# Patient Record
Sex: FEMALE | Race: WHITE | NOT HISPANIC OR LATINO | Employment: FULL TIME | URBAN - METROPOLITAN AREA
[De-identification: names, ages, dates, MRNs, and addresses within clinical notes are randomized per-mention and may not be internally consistent; named-entity substitution may affect disease eponyms.]

---

## 2017-04-25 ENCOUNTER — TELEPHONE (OUTPATIENT)
Dept: NEUROLOGY | Facility: HOSPITAL | Age: 42
End: 2017-04-25

## 2017-04-25 DIAGNOSIS — C7A.026 CARCINOID TUMOR, RECTAL, MALIGNANT: Primary | ICD-10-CM

## 2017-04-25 NOTE — TELEPHONE ENCOUNTER
----- Message from Sonia Nevarez sent at 4/25/2017  9:01 AM CDT -----  Contact: Patient  EAW/NEW Who Called? Patient                                      Referred by: through a Radiologist                                      Why do you need to be seen? September 2016 was diagnosed                                      Which doctor are you requesting? Dr Anaya                                      You may contact patient back to schedule at: [phone]    Instructed patient to gather medical records, Cd's and medication list and fax or mail to us asap.

## 2017-04-25 NOTE — LETTER
April 26, 2017    Celena Kumar  [address] [facility] [address]             [facility]  Tumor Program  [address]  [address]  [place] [address]  Phone: [phone]  Fax: [phone] Dear Ms. Kumar:    Thank you for your interest in our program. It was my pleasure speaking with you today about your upcoming appointment.     You have a scheduled appointment with Dr Juan J Anaya MD on Tuesday, May 30, 2017 at 8:30 AM. Our office is located at :    [facility]  Medical Office [facility]  Neuroendocrine Tumor Clinic  [address], [address]  [place], [address]    You are scheduled for a consultation only with the physicians unless otherwise planned. Plan to be here for your visit for 2-3 hrs. If flight arrangements are made, plan to make the return flight after 6 pm if possible. The [place] airport ([facility] is only 10 minutes from [facility].    In preparation for your appointment, we ask that you gather the information below and fax them to us ASAP. This will enable us to review all pertinent information at the time of your visit so that recommendations can be made and a plan of care developed for you.     Please return forms along with all paper records via fax asap.    Please fax  1. Insurance cards (front and back)-enlarged if possible  2. Drivers licenses  3. Current medicine list  4. Name, address & phone # of your physician for correspondence  5. Authorization for Release of Information-complete and return to clinic  6. Medical Records-send as soon as you have them together (see guidelines below)    Lab Work: If requested, the special lab markers do require special tubes that have to be ordered by the ordering facility. The lab work should be within 3 months.. The labs take 2-3 weeks to get results so please get labs drawn asap. The name and phone # of the send out lab that does the special labs tests is on the order. You can have the labs  drawn at [facility], a local hospital, or your doctors office (whichever works). If these lab tests need to be done, I will attach an Outpatient Order form. If you are doing your lab work at a facility other than [facility], call our office to notify us the date you have them drawn and the location and phone number of the lab for easy follow up.    Scans: Please mail copies of CD's of your last scans to the office asap. I would recommend sending them overnight with a tracking number in case of any problems. If you need updated scans, I will attach an Outpatient Order form.    Tissue Biopsy/Pathology: If you had a tissue biopsy or surgery, we will request to have your slides and/or tissue blocks sent to us to perform some special testing on them. Please provide us with a pathology report asa. This testing will be billed to your insurance company.     Operative or Procedure reports: All surgery or procedure reports related to your neuroendocrine tumor should be sent to us.    Insurance Company: You should contact your insurance company to inquire about your insurance coverage and benefits. Ask about co-payments and deductibles when seeing a specialist. Ask if this visit will be in Network or Out of Network. We may be able to work with you if this is out of network for you.    Lodging: Attached is lodging information from the [employer] and a list of local hotels. The [facility] is run by the [employer] and is free of charge. If [facility] to [facility], you must call my office and talk to [facility]. You will need to complete the application and send it to my office for a physician signature. We will forward it to the [facility] and they will check availability. If you wish to stay at the [facility], apply EARLY, they fill up quickly. You may contact the [facility] a week later to confirm your reservation and ask any questions regarding the facility. You  May only stay at the [facility] 24 hrs prior to your  appointment and up to 24 hrs after your appointment. (THIS CAN ONLY BE USED IF YOU LIVE MORE THAN 40 MILES FROM OUR FACILITY).    Call me if you have any questions, email is not the best way to communicate with our office.    We are looking forward to meeting and taking care of you. If you have any questions or concerns, please don't hesitate to call.     Sincerely,        Shi Jason, RN, BSN  Nurse Manager, Neuroendocrine Tumor Program

## 2017-04-26 NOTE — TELEPHONE ENCOUNTER
Ordered MRI, tumor markers and path re read per protocol. Appointment made with MD, info sent to patient. Diagnosed with appendix and had appendectomy then had a c-scope and found it in her rectum.  In lots of pain, never tested any lymph nodes.  Wants a gallium scan and is coming back in August to have that done.

## 2017-04-26 NOTE — TELEPHONE ENCOUNTER
----- Message from Sonia Nevarez sent at 4/25/2017  9:01 AM CDT -----  Contact: Patient  EAW/NEW Who Called? Patient                                      Referred by: through a Radiologist                                      Why do you need to be seen? September 2016 was diagnosed                                      Which doctor are you requesting? Dr Anaya                                      You may contact patient back to schedule at: [phone]    Instructed patient to gather medical records, Cd's and medication list and fax or mail to us asap.

## 2017-05-15 LAB
EXT 24 HR UR METANEPHRINE: NORMAL
EXT 24 HR UR NORMETANEPHRINE: NORMAL
EXT 24 HR UR NORMETANEPHRINE: NORMAL
EXT 25 HYDROXY VIT D2: NORMAL
EXT 25 HYDROXY VIT D3: NORMAL
EXT 5 HIAA 24 HR URINE: NORMAL
EXT 5 HIAA BLOOD: 14 NG/ML (ref 0–22)
EXT ACTH: NORMAL
EXT AFP: NORMAL
EXT ALBUMIN: NORMAL
EXT ALKALINE PHOSPHATASE: NORMAL
EXT ALT: NORMAL
EXT AMYLASE: NORMAL
EXT ANTI ISLET CELL AB: NORMAL
EXT ANTI PARIETAL CELL AB: NORMAL
EXT ANTI THYROID AB: NORMAL (ref 0–1.25)
EXT AST: NORMAL
EXT BILIRUBIN DIRECT: NORMAL MG/DL
EXT BILIRUBIN TOTAL: NORMAL
EXT BK VIRUS DNA QN PCR: NORMAL
EXT BUN: NORMAL
EXT C PEPTIDE: NORMAL
EXT CA 125: NORMAL
EXT CA 19-9: NORMAL
EXT CA 27-29: NORMAL
EXT CALCITONIN: NORMAL
EXT CALCIUM: NORMAL
EXT CEA: NORMAL
EXT CHLORIDE: NORMAL
EXT CHOLESTEROL: NORMAL
EXT CHROMOGRANIN A: 7 NG/ML (ref 0–15)
EXT CO2: NORMAL
EXT CREATININE UA: NORMAL
EXT CREATININE: NORMAL MG/DL
EXT CYCLOSPORONE LEVEL: NORMAL
EXT DOPAMINE: NORMAL
EXT EBV DNA BY PCR: NORMAL
EXT EPINEPHRINE: NORMAL
EXT FOLATE: 6.8 NG/ML
EXT FREE T3: NORMAL
EXT FREE T4: NORMAL
EXT FSH: NORMAL
EXT GASTRIN RELEASING PEPTIDE: NORMAL
EXT GASTRIN RELEASING PEPTIDE: NORMAL
EXT GASTRIN: 17 PG/ML (ref 0–100)
EXT GGT: NORMAL
EXT GHRELIN: NORMAL
EXT GLUCAGON: NORMAL
EXT GLUCOSE: NORMAL
EXT GROWTH HORMONE: NORMAL
EXT HCV RNA QUANT PCR: NORMAL
EXT HDL: NORMAL
EXT HEMATOCRIT: NORMAL
EXT HEMOGLOBIN A1C: NORMAL
EXT HEMOGLOBIN: NORMAL
EXT HISTAMINE 24 HR URINE: NORMAL
EXT HISTAMINE: NORMAL
EXT IGF-1: NORMAL
EXT IMMUNKNOW (NON-STIMULATED): NORMAL
EXT IMMUNKNOW (STIMULATED): NORMAL
EXT INR: NORMAL
EXT INSULIN: NORMAL
EXT LANREOTIDE LEVEL: NORMAL
EXT LDH, TOTAL: NORMAL
EXT LDL CHOLESTEROL: NORMAL
EXT LIPASE: NORMAL
EXT MAGNESIUM: NORMAL
EXT METANEPHRINE FREE PLASMA: NORMAL
EXT MOTILIN: NORMAL
EXT NEUROKININ A CAMB: NORMAL
EXT NEUROKININ A ISI: 14 PG/ML (ref 0–40)
EXT NEUROTENSIN: NORMAL
EXT NOREPINEPHRINE: NORMAL
EXT NORMETANEPHRINE: NORMAL
EXT NSE: NORMAL
EXT OCTREOTIDE LEVEL: NORMAL
EXT PANCREASTATIN CAMB: NORMAL
EXT PANCREASTATIN ISI: 47 PG/ML (ref 10–135)
EXT PANCREATIC POLYPEPTIDE: NORMAL
EXT PHOSPHORUS: NORMAL
EXT PLATELETS: NORMAL
EXT POTASSIUM: NORMAL
EXT PROGRAF LEVEL: NORMAL
EXT PROLACTIN: NORMAL
EXT PROTEIN TOTAL: NORMAL
EXT PROTEIN UA: NORMAL
EXT PT: NORMAL
EXT PTH, INTACT: NORMAL
EXT PTT: NORMAL
EXT RAPAMUNE LEVEL: NORMAL
EXT SEROTONIN: <10 NG/ML (ref 56–244)
EXT SODIUM: NORMAL MMOL/L
EXT SOMATOSTATIN: NORMAL
EXT SUBSTANCE P: NORMAL
EXT TRIGLYCERIDES: NORMAL
EXT TRYPTASE: NORMAL
EXT TSH: NORMAL
EXT URIC ACID: NORMAL
EXT URINE AMYLASE U/HR: NORMAL
EXT URINE AMYLASE U/L: NORMAL
EXT VASOACTIVE INTESTINAL POLYPEPTIDE: NORMAL
EXT VITAMIN B12: 463 PG/ML (ref 200–1100)
EXT VMA 24 HR URINE: NORMAL
EXT WBC: NORMAL
NEURON SPECIFIC ENOLASE: <5 NG/ML (ref 0–10.8)

## 2017-05-19 ENCOUNTER — TELEPHONE (OUTPATIENT)
Dept: NEUROLOGY | Facility: HOSPITAL | Age: 42
End: 2017-05-19

## 2017-05-19 NOTE — TELEPHONE ENCOUNTER
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 Do you need any other testing done? Patient can be reached at [phone]

## 2017-05-22 ENCOUNTER — TELEPHONE (OUTPATIENT)
Dept: NEUROLOGY | Facility: HOSPITAL | Age: 42
End: 2017-05-22

## 2017-05-22 DIAGNOSIS — C7A.026 CARCINOID TUMOR, RECTAL, MALIGNANT: Primary | ICD-10-CM

## 2017-05-22 NOTE — TELEPHONE ENCOUNTER
----- Message from Alia Gates sent at 5/22/2017 10:55 AM CDT -----  EAW- Patient just missed a call from here but there was no message left. Please call back to assist.

## 2017-05-22 NOTE — TELEPHONE ENCOUNTER
Returned pts call to inform that I spoke with Dr. Anaya regarding being diagnosed with Martin syndrome. Advised pt that per Dr. Anaya, no additional testing was needed prior to appointment, but pt should get yearly colonoscopies. Pt verbalizes understanding.

## 2017-05-25 ENCOUNTER — LAB VISIT (OUTPATIENT)
Dept: LAB | Facility: HOSPITAL | Age: 42
End: 2017-05-25
Attending: SURGERY
Payer: COMMERCIAL

## 2017-05-25 DIAGNOSIS — C7A.026 CARCINOID TUMOR, RECTAL, MALIGNANT: ICD-10-CM

## 2017-05-25 PROCEDURE — 88399 UNLISTED SURGICAL PATH PX: CPT

## 2017-05-25 PROCEDURE — 88323 CONSLTJ&REPRT MATRL PREP SLD: CPT | Mod: 26,59,,

## 2017-05-25 PROCEDURE — 88360 TUMOR IMMUNOHISTOCHEM/MANUAL: CPT | Mod: 26,,,

## 2017-05-25 PROCEDURE — 88360 TUMOR IMMUNOHISTOCHEM/MANUAL: CPT | Mod: 26,59,,

## 2017-05-29 ENCOUNTER — HOSPITAL ENCOUNTER (OUTPATIENT)
Dept: CARDIOLOGY | Facility: HOSPITAL | Age: 42
Discharge: HOME | End: 2017-05-29
Attending: SURGERY
Payer: COMMERCIAL

## 2017-05-29 ENCOUNTER — HOSPITAL ENCOUNTER (OUTPATIENT)
Dept: RADIOLOGY | Facility: HOSPITAL | Age: 42
Discharge: HOME | End: 2017-05-29
Attending: SURGERY
Payer: COMMERCIAL

## 2017-05-29 DIAGNOSIS — C7A.026 CARCINOID TUMOR, RECTAL, MALIGNANT: ICD-10-CM

## 2017-05-29 LAB
DIASTOLIC DYSFUNCTION: NO
ESTIMATED PA SYSTOLIC PRESSURE: 7.33
MITRAL VALVE MOBILITY: NORMAL
RETIRED EF AND QEF - SEE NOTES: 65 (ref 55–65)

## 2017-05-29 PROCEDURE — 93306 TTE W/DOPPLER COMPLETE: CPT | Mod: 26,,, | Performed by: INTERNAL MEDICINE

## 2017-05-29 PROCEDURE — A9585 GADOBUTROL INJECTION: HCPCS | Performed by: SURGERY

## 2017-05-29 PROCEDURE — 74183 MRI ABD W/O CNTR FLWD CNTR: CPT | Mod: TC

## 2017-05-29 PROCEDURE — 93306 TTE W/DOPPLER COMPLETE: CPT

## 2017-05-29 PROCEDURE — 74183 MRI ABD W/O CNTR FLWD CNTR: CPT | Mod: 26,,, | Performed by: RADIOLOGY

## 2017-05-29 PROCEDURE — 25500020 PHARM REV CODE 255: Performed by: SURGERY

## 2017-05-29 RX ORDER — GADOBUTROL 604.72 MG/ML
10 INJECTION INTRAVENOUS
Status: COMPLETED | OUTPATIENT
Start: 2017-05-29 | End: 2017-05-29

## 2017-05-29 RX ADMIN — GADOBUTROL 10 ML: 604.72 INJECTION INTRAVENOUS at 10:05

## 2017-05-30 ENCOUNTER — CLINICAL SUPPORT (OUTPATIENT)
Dept: NEUROLOGY | Facility: HOSPITAL | Age: 42
End: 2017-05-30
Payer: COMMERCIAL

## 2017-05-30 ENCOUNTER — OFFICE VISIT (OUTPATIENT)
Dept: NEUROLOGY | Facility: HOSPITAL | Age: 42
End: 2017-05-30
Attending: SURGERY
Payer: COMMERCIAL

## 2017-05-30 ENCOUNTER — HOSPITAL ENCOUNTER (OUTPATIENT)
Dept: RADIOLOGY | Facility: HOSPITAL | Age: 42
Discharge: HOME | End: 2017-05-30
Attending: SURGERY
Payer: COMMERCIAL

## 2017-05-30 VITALS
TEMPERATURE: 97 F | BODY MASS INDEX: 29.63 KG/M2 | HEART RATE: 89 BPM | WEIGHT: 161 LBS | HEIGHT: 62 IN | SYSTOLIC BLOOD PRESSURE: 118 MMHG | DIASTOLIC BLOOD PRESSURE: 73 MMHG

## 2017-05-30 DIAGNOSIS — C7A.020 MALIGNANT CARCINOID TUMOR OF THE APPENDIX: ICD-10-CM

## 2017-05-30 DIAGNOSIS — R19.7 DIARRHEA DUE TO MALABSORPTION: ICD-10-CM

## 2017-05-30 DIAGNOSIS — C7A.026 MALIGNANT CARCINOID TUMOR OF THE RECTUM: ICD-10-CM

## 2017-05-30 DIAGNOSIS — K90.9 DIARRHEA DUE TO MALABSORPTION: ICD-10-CM

## 2017-05-30 DIAGNOSIS — E34.0 CARCINOID SYNDROME: Primary | ICD-10-CM

## 2017-05-30 LAB
EXT 24 HR UR METANEPHRINE: ABNORMAL
EXT 24 HR UR NORMETANEPHRINE: ABNORMAL
EXT 24 HR UR NORMETANEPHRINE: ABNORMAL
EXT 25 HYDROXY VIT D2: ABNORMAL
EXT 25 HYDROXY VIT D3: ABNORMAL
EXT 5 HIAA 24 HR URINE: ABNORMAL
EXT 5 HIAA BLOOD: 7 NG/ML (ref 0–22)
EXT ACTH: ABNORMAL
EXT AFP: ABNORMAL
EXT ALBUMIN: ABNORMAL
EXT ALKALINE PHOSPHATASE: ABNORMAL
EXT ALT: ABNORMAL
EXT AMYLASE: ABNORMAL
EXT ANTI ISLET CELL AB: ABNORMAL
EXT ANTI PARIETAL CELL AB: ABNORMAL
EXT ANTI THYROID AB: ABNORMAL
EXT AST: ABNORMAL
EXT BILIRUBIN DIRECT: ABNORMAL MG/DL
EXT BILIRUBIN TOTAL: ABNORMAL
EXT BK VIRUS DNA QN PCR: ABNORMAL
EXT BUN: ABNORMAL
EXT C PEPTIDE: ABNORMAL
EXT CA 125: ABNORMAL
EXT CA 19-9: ABNORMAL
EXT CA 27-29: ABNORMAL
EXT CALCITONIN: ABNORMAL
EXT CALCIUM: ABNORMAL
EXT CEA: ABNORMAL
EXT CHLORIDE: ABNORMAL
EXT CHOLESTEROL: ABNORMAL
EXT CHROMOGRANIN A: ABNORMAL
EXT CO2: ABNORMAL
EXT CREATININE UA: ABNORMAL
EXT CREATININE: ABNORMAL MG/DL
EXT CYCLOSPORONE LEVEL: ABNORMAL
EXT DOPAMINE: ABNORMAL
EXT EBV DNA BY PCR: ABNORMAL
EXT EPINEPHRINE: ABNORMAL
EXT FOLATE: ABNORMAL
EXT FREE T3: ABNORMAL
EXT FREE T4: ABNORMAL
EXT FSH: ABNORMAL
EXT GASTRIN RELEASING PEPTIDE: ABNORMAL
EXT GASTRIN RELEASING PEPTIDE: ABNORMAL
EXT GASTRIN: ABNORMAL
EXT GGT: ABNORMAL
EXT GHRELIN: ABNORMAL
EXT GLUCAGON: ABNORMAL
EXT GLUCOSE: ABNORMAL
EXT GROWTH HORMONE: ABNORMAL
EXT HCV RNA QUANT PCR: ABNORMAL
EXT HDL: ABNORMAL
EXT HEMATOCRIT: ABNORMAL
EXT HEMOGLOBIN A1C: ABNORMAL
EXT HEMOGLOBIN: ABNORMAL
EXT HISTAMINE 24 HR URINE: ABNORMAL
EXT HISTAMINE: ABNORMAL
EXT IGF-1: ABNORMAL
EXT IMMUNKNOW (NON-STIMULATED): ABNORMAL
EXT IMMUNKNOW (STIMULATED): ABNORMAL
EXT INR: ABNORMAL
EXT INSULIN: ABNORMAL
EXT LANREOTIDE LEVEL: ABNORMAL
EXT LDH, TOTAL: ABNORMAL
EXT LDL CHOLESTEROL: ABNORMAL
EXT LIPASE: ABNORMAL
EXT MAGNESIUM: ABNORMAL
EXT METANEPHRINE FREE PLASMA: ABNORMAL
EXT MOTILIN: ABNORMAL
EXT NEUROKININ A CAMB: ABNORMAL
EXT NEUROKININ A ISI: 19 PG/ML (ref 0–40)
EXT NEUROTENSIN: ABNORMAL
EXT NOREPINEPHRINE: ABNORMAL
EXT NORMETANEPHRINE: ABNORMAL
EXT NSE: ABNORMAL
EXT OCTREOTIDE LEVEL: ABNORMAL
EXT PANCREASTATIN CAMB: ABNORMAL
EXT PANCREASTATIN ISI: 20 PG/ML (ref 10–135)
EXT PANCREATIC POLYPEPTIDE: ABNORMAL
EXT PHOSPHORUS: ABNORMAL
EXT PLATELETS: ABNORMAL
EXT POTASSIUM: ABNORMAL
EXT PROGRAF LEVEL: ABNORMAL
EXT PROLACTIN: ABNORMAL
EXT PROTEIN TOTAL: ABNORMAL
EXT PROTEIN UA: ABNORMAL
EXT PT: ABNORMAL
EXT PTH, INTACT: ABNORMAL
EXT PTT: ABNORMAL
EXT RAPAMUNE LEVEL: ABNORMAL
EXT SEROTONIN: <10 NG/ML (ref 56–244)
EXT SODIUM: ABNORMAL MMOL/L
EXT SOMATOSTATIN: ABNORMAL
EXT SUBSTANCE P: 26 PG/ML (ref 40–270)
EXT TRIGLYCERIDES: ABNORMAL
EXT TRYPTASE: ABNORMAL
EXT TSH: ABNORMAL
EXT URIC ACID: ABNORMAL
EXT URINE AMYLASE U/HR: ABNORMAL
EXT URINE AMYLASE U/L: ABNORMAL
EXT VASOACTIVE INTESTINAL POLYPEPTIDE: ABNORMAL
EXT VITAMIN B12: ABNORMAL
EXT VMA 24 HR URINE: ABNORMAL
EXT WBC: ABNORMAL
NEURON SPECIFIC ENOLASE: 18.1 NG/ML (ref 0–10.8)

## 2017-05-30 PROCEDURE — 99212 OFFICE O/P EST SF 10 MIN: CPT | Mod: 25,27

## 2017-05-30 PROCEDURE — 76770 US EXAM ABDO BACK WALL COMP: CPT | Mod: 26,,, | Performed by: RADIOLOGY

## 2017-05-30 PROCEDURE — 99215 OFFICE O/P EST HI 40 MIN: CPT | Performed by: SURGERY

## 2017-05-30 PROCEDURE — 76770 US EXAM ABDO BACK WALL COMP: CPT | Mod: TC

## 2017-05-30 RX ORDER — ZOLPIDEM TARTRATE 10 MG/1
TABLET ORAL
COMMUNITY
Start: 2015-07-30

## 2017-05-30 RX ORDER — CYCLOBENZAPRINE HCL 5 MG
TABLET ORAL
COMMUNITY
Start: 2017-03-03 | End: 2017-05-30

## 2017-05-30 RX ORDER — CLONAZEPAM 0.5 MG/1
TABLET ORAL
COMMUNITY
Start: 2015-08-26 | End: 2017-05-30

## 2017-05-30 RX ORDER — DULOXETIN HYDROCHLORIDE 20 MG/1
30 CAPSULE, DELAYED RELEASE ORAL DAILY
COMMUNITY
Start: 2017-05-12 | End: 2018-04-17

## 2017-05-30 RX ORDER — LISDEXAMFETAMINE DIMESYLATE 30 MG/1
CAPSULE ORAL
COMMUNITY
Start: 2015-08-26 | End: 2017-05-30

## 2017-05-30 RX ORDER — FLUCONAZOLE 150 MG/1
TABLET ORAL
COMMUNITY
Start: 2017-05-12 | End: 2017-05-30

## 2017-05-30 RX ORDER — SUMATRIPTAN SUCCINATE 100 MG/1
TABLET ORAL
COMMUNITY
Start: 2015-08-04 | End: 2017-05-30

## 2017-05-30 RX ORDER — LIDOCAINE 50 MG/G
3 PATCH TOPICAL
COMMUNITY
Start: 2017-05-04

## 2017-05-30 RX ORDER — PANTOPRAZOLE SODIUM 40 MG/1
40 TABLET, DELAYED RELEASE ORAL
COMMUNITY
End: 2017-05-30

## 2017-05-30 RX ORDER — METHYLPHENIDATE HYDROCHLORIDE 27 MG/1
27 TABLET ORAL
COMMUNITY
Start: 2016-12-04 | End: 2017-05-30

## 2017-05-30 RX ORDER — SUMATRIPTAN 50 MG/1
TABLET, FILM COATED ORAL
COMMUNITY
Start: 2016-11-14

## 2017-05-30 RX ORDER — HYDROXYZINE HYDROCHLORIDE 25 MG/1
25 TABLET, FILM COATED ORAL
COMMUNITY
End: 2017-05-30

## 2017-05-30 RX ORDER — AZELASTINE HCL 205.5 UG/1
SPRAY NASAL
COMMUNITY
Start: 2017-04-28 | End: 2018-04-17

## 2017-05-30 RX ORDER — PREDNISONE 20 MG/1
20 TABLET ORAL
COMMUNITY
End: 2017-05-30

## 2017-05-30 RX ORDER — ALBUTEROL SULFATE 90 UG/1
AEROSOL, METERED RESPIRATORY (INHALATION)
COMMUNITY
Start: 2016-12-06

## 2017-05-30 RX ORDER — DEXTROAMPHETAMINE SACCHARATE, AMPHETAMINE ASPARTATE MONOHYDRATE, DEXTROAMPHETAMINE SULFATE AND AMPHETAMINE SULFATE 7.5; 7.5; 7.5; 7.5 MG/1; MG/1; MG/1; MG/1
CAPSULE, EXTENDED RELEASE ORAL
COMMUNITY
Start: 2017-05-05

## 2017-05-30 RX ORDER — GABAPENTIN 300 MG/1
600 CAPSULE ORAL 3 TIMES DAILY
COMMUNITY
Start: 2017-05-03 | End: 2017-10-16

## 2017-05-30 RX ORDER — DIAZEPAM 2 MG/1
TABLET ORAL
COMMUNITY
Start: 2017-03-31

## 2017-05-30 RX ORDER — FLUTICASONE PROPIONATE 220 UG/1
AEROSOL, METERED RESPIRATORY (INHALATION)
COMMUNITY
Start: 2017-04-28 | End: 2018-04-17

## 2017-05-30 RX ORDER — GABAPENTIN ENACARBIL 600 MG/1
600 TABLET, EXTENDED RELEASE ORAL 2 TIMES DAILY
COMMUNITY
Start: 2017-05-05 | End: 2017-10-16

## 2017-05-30 RX ORDER — LORAZEPAM 1 MG/1
TABLET ORAL
COMMUNITY
Start: 2016-12-03 | End: 2017-05-30

## 2017-05-30 RX ORDER — ONDANSETRON 4 MG/1
TABLET, ORALLY DISINTEGRATING ORAL
COMMUNITY
Start: 2016-11-09

## 2017-05-30 RX ORDER — DEXAMETHASONE 4 MG/1
TABLET ORAL
COMMUNITY
Start: 2017-04-13 | End: 2017-05-30

## 2017-05-30 RX ORDER — LORAZEPAM 0.5 MG/1
TABLET ORAL
COMMUNITY
End: 2017-05-30

## 2017-05-30 RX ORDER — OXYCODONE AND ACETAMINOPHEN 5; 325 MG/1; MG/1
2 TABLET ORAL
COMMUNITY
Start: 2015-09-03 | End: 2017-05-30

## 2017-05-30 RX ORDER — CETIRIZINE HYDROCHLORIDE 10 MG/1
10 TABLET ORAL DAILY
COMMUNITY

## 2017-05-30 NOTE — PATIENT INSTRUCTIONS
Scans: 6 month intervals due 10/17     Return to Clinic:6 month intervals     Orders placed this visit:       Orders Placed This Encounter   Procedures    US Retroperitoneal Complete    CT Abdomen Pelvis With Contrast    MRI Abdomen W WO Contrast    5-HIAA Plasma (Neuroendocrine)    Serotonin (Neuroendo)    Pancreastatin    Neurokinin A    Substance P    Neuron Specific Enolase, Serum     LAbs today at [facility] on 3rd floor.  See dietician today  Ultrasound today.      Plan:eus of rectum alternating with flex sig every 6 months--next one is rectal EUS due 9/17   Flex sig will be due 3/18      LAB today   ultrasound of kidneys today     rtc 3 months        Labs: Markers: 3 month intervals due 8/17 11/17              Other:  Echocardiogram yearly due 5/18

## 2017-05-30 NOTE — PROGRESS NOTES
"NOLANETS:  [facility]  A collaboration between [facility] and [facility]    PATIENT: Celena Kumar  MRN: [id number]  DATE: 5/30/2017    Vitals:    05/30/17 0825   BP: 118/73   Pulse: 89   Temp: 97.1 °F (36.2 °C)   Weight: 73 kg (161 lb)   Height: 5' 2" (1.575 m)      Last 2 Weight Measurements:   Wt Readings from Last 2 Encounters:   05/30/17 73 kg (161 lb)     BMI: Body mass index is 29.45 kg/m².    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 Kumar  Is here to evaluate and treat a appendiceal NET and a rectal net--- the appendix was removed 2016 and the rectal NET was removed in 2017    Past Medical History:   Diagnosis Date    Carcinoid tumor of appendix, malignant 09/22/2016    Carcinoid tumor, rectal, malignant 03/2017    S/P cholecystectomy 09/22/2016       Past Surgical History:   Procedure Laterality Date    Appendectomy & gallbladder  09/22/2016    NET    colonscopy  03/2017    Carcinoid tumor    HYSTERECTOMY      KNEE SURGERY      SHOULDER SURGERY         Review of patient's allergies indicates:   Allergen Reactions    Contrast media Hives    Demerol [meperidine] Shortness Of Breath    Dilaudid [hydromorphone (bulk)] Shortness Of Breath    Strattera [atomoxetine] Shortness Of Breath    Sulfa (sulfonamide antibiotics) Rash    Vancomycin analogues Hives       Current Outpatient Prescriptions   Medication Sig Dispense Refill    albuterol (VENTOLIN HFA) 90 mcg/actuation inhaler as needed.       azelastine 0.15 % (205.5 mcg) Spry as needed.       cetirizine (ZYRTEC) 10 MG tablet Take 10 mg by mouth once daily.      dextroamphetamine-amphetamine (ADDERALL XR) 30 MG 24 hr capsule as needed.       diazePAM " (VALIUM) 2 MG tablet as needed.       duloxetine (CYMBALTA) 20 MG capsule 60 mg once daily.       FLOVENT  mcg/actuation inhaler as needed.       gabapentin (NEURONTIN) 300 MG capsule 300 mg every evening.       HORIZANT 600 mg TbSR 600 mg 2 (two) times daily.       inhalation spacing device (INSPIRACHAMBER)       lidocaine (LIDODERM) 5 % 3 patches as needed.       ondansetron (ZOFRAN-ODT) 4 MG TbDL DIS ONE T PO  Q 6 H PRN N      ranitidine (ZANTAC) 300 MG capsule Take 300 mg by mouth as needed.       sumatriptan (IMITREX) 50 MG tablet TK 1 T PO FOR MIGRAINE HEADACHE. MAY REPEAT IN 2 HOURS BUT NOT TO EXCEED 4 TS IN 24 HOURS/ as needed      zolpidem (AMBIEN) 10 mg Tab nightly as needed.        No current facility-administered medications for this visit.        Review of Systems     Number of Days per Week Number per Day   DIARRHEA 7 5   BRISTOL STOOL SCALE RATING Type 6 and type 7--occasional type 4    FLUSHING 7 10   WHEEZING 0      WEIGHT GAIN/LOSS +30   ENERGY RATING (0-10) 10      Physical Exam deferred.           CONCLUSIONS  Echo yesterday    1 - Normal left ventricular systolic function (EF 60-65%).     2 - Normal left ventricular diastolic function.     3 - Normal right ventricular systolic function .                        Pathology Data:                  Laboratory Data:--needs appendix markers and rectal markers today and in three months    Radiology Data:  MRI abdomen without and with contrast.    Comparison: 3/31/2017    History: NET    Results: Axial gradient T2, axial T1 in and out of phase, axial T2 SSFSE, coronal T2 gradient and T2 SSFSE followed by pre-contrast axial T1 gradient fat sat and dynamic post contrast images were obtained. The patient received  10 cc of IV Gadovist contrast.    The lung bases are clear.  The liver appears normal,  no lesion seen.  The gallbladder has been removed.  The biliary ducts are not dilated.  The stomach, pancreas, spleen, adrenal glands and  bilateral kidneys appear normal.  There is a small left kidney cyst.  The aorta tapers normally, no aortic lymphadenopathy.  No inflammatory changes of the bowel seen.  No mesenteric abnormalities seen.  The osseous structures appear normal.   Impression        No evidence of metastatic disease.  Left kidney cyst.  Cholecystectomy.             Impression:  1.  s/p appy and rectal NETS       Plan:eus of rectum alternating with flex sig every 6 months--next one is flex sig   labs today   LAB today   ultrasound of kidneys today   rtc 3 months       Labs: Markers: 3 month intervals            Other:  Echocardiogram yearly    Scans: 6 month intervals    Return to Clinic:6 month intervals    Orders placed this visit:   Orders Placed This Encounter   Procedures    US Retroperitoneal Complete    CT Abdomen Pelvis With Contrast    MRI Abdomen W WO Contrast    5-HIAA Plasma (Neuroendocrine)    Serotonin (Neuroendo)    Pancreastatin    Neurokinin A    Substance P    Neuron Specific Enolase, Serum        60 new lab eaw Minutes Face-to-Face; Counseling/Coordination of Care > 50 percent of Visit     Juan J Anaya MD, FACS  The [employer]up Medrano Professor of Surgery, [facility]  [address], [address]  [place]  [address]  Cell [phone]  ph. [phone]; [phone]  fax. [phone]  [email or web address]

## 2017-05-30 NOTE — LETTER
May 30, 2017             NOLANETS: [facility]  A collaboration between [facility] and [facility] [address]  [address]  [place] [address]  Phone: [phone]  Fax: [phone]        CHANEL Walton M.D., FACS  Valdo Miller M.D.  Stoney Smith M.D.   Alexus Pendleton M.D., FACS  DO Juan J Dennis M.D., FACS   Patient: Celena Kumar   MR Number: [id number]   YOB: 1975   Date of Visit: 5/30/2017     Thank you for the kind referral of Celena Kumar. We saw this patient on 5/30/2017, and a copy of our clinic note is enclosed. We certainly appreciate the opportunity to participate in your patient's care.     If you have any questions or wish to discuss your patient further, please do not hesitate to contact us at [phone].       Kindest personal regards,          Juan J Anaya MD, FACS  The Anup Medrano Professor of Surgery & Neurosciences  [facility], Dept. Of Surgery  [address], [address]  [place] [address] [phone]

## 2017-05-31 NOTE — PATIENT INSTRUCTIONS
Preventing Diarrhea due to Rapid Transit Time    *Symptoms of dumping syndrome include: feeling weak, dizzy and/or flushed within 30 minutes of eating. Cramping, pain, nausea, diarrhea and/or sweating may also occur.     Tips to avoid diarrhea related to rapid transit and dumping syndrome:   Eat 4-6 small meals throughout the day   Try to eat a source of protein at each meal (such as: poultry, red meat, eggs, tofu, milk, yogurt, or cheese)   Limit concentrated sugars like candies, cookies, soda, juice, and syrup   Drink fluids 30-60 minutes before and after meals and snacks, but not during meals   Choose foods with soluble fibers (such as: oats, quinoa, fruits and vegetables without peels, and legumes)   Avoid extreme hot or cold foods and beverages   Eat slowly and rest for 15 minutes after a meal with feet up    Food Group Foods Allowed Foods to Avoid   Beverages  6-8 cups daily (no ice) Decaffeinated coffee and tea. Sugar free beverages, water drinks without sugar, water Caffeinated coffee or tea. Beverages made with sugar, corn syrup, or honey. Fruit juices and fruit drinks. Carbonated drinks.    Starches/Grains  8-12 servings daily Complex carbohydrates (such as: white bread, oatmeal, quinoa, cereal, potatoes, barley, white rice, and pasta).   High soluble fiber Sweet rolls, donuts, pastries, and cakes. Sugar cereals. Whole wheat, rye, pumpernickel brown rice and whole grains.  Low insoluble fiber   Meat and Other Protein Foods  Limit red meat to 12 ounces weekly Tender, well-cooked meats, poultry, and fish. Egg whites (whole eggs if tolerated). Soy foods prepared without added fat. Smooth peanut butter allowed if tolerable of fats. Powdered peanut butter (PB2) Fried meat, poultry, or fish. Luncheon meats (i.e. Bologna or salami). Sausage, hot dogs, stallings. Tough or chewy meats. Dried beans and peas. (i.e. Schreiber or kidney beans). Seeds or nuts.   Fats  1-3 tsp per meal A small to moderate amount of fat as  tolerated: MCT oil, coconut oil, olive oil, canola, butter, margarine, cream, and cream cheese. High amounts of fat such as fried foods, avocados, olives, butter, vegetable oils, fried foods.   Fruits  3-6 servings daily Canned in fruit juice, light syrup and drained. Fresh fruit without the peel. Diluted fruit 1 part to 3 water Canned fruit in sugar or syrup. Fruit juice. Dried fruits including prunes and raisins.   Vegetables  3-6 servings daily Fresh, frozen or canned vegetables cooked to a soft consistency. Raw vegetables (unless finely chopped).   Milk or Milk Foods  2-3 servings daily Milk (buttermilk, skim, 1% fat, and soy milk with no added sugar). Plain yogurt with no added sugar. Lactose free products. Cheese. Low-fat sugar ice cream. Whole milk, chocolate milk, half and half, regular ice cream, or creamers.   Miscellaneous Any allowed foods made with artificial sweeteners including: saccharin (Sweet 'N Low), sucralose (Splenda), and acesulfame potassium (Sunette, SweetOne), Stevia. Sugar, honey, syrup, jelly. Any sugar alcohol (such as: sorbitol, isomalt, hydrogenated starch hydrosylat or mannitol or xylitol).  Foods that list sugar, honey, syrup, xylitol, or sorbitol as one of the first three ingredients on the food label.     Source: http://applications.[employer].org/education/document.aspx?url=Patient+Education%0Mf22489.pdf

## 2017-05-31 NOTE — PROGRESS NOTES
Patient being seen for nutrition assessment and nutrition education    Patient st. Having malabsorption and flushing.     BMI 29  She st. She gained 30 lbs over past few years due to severe hypoglycemia. She added more meals through out the day.     Pt. has occasional nausea and she treats with zofran. Medications include adderall XR     Calorie need ~ 1600 protein ~ 80 grams    PES malabsorption and diarrhea as st. By patient . Possible reactive hypoglycemia.       Provided meal plan for diet 3 meals 3 snacks of diet to reduce risk of malabsorption. She may be experiencingsensitivity or problems of flushing related diet histamines or medication.s    Handouts provided and reviewed.

## 2017-06-05 ENCOUNTER — TELEPHONE (OUTPATIENT)
Dept: NEUROLOGY | Facility: HOSPITAL | Age: 42
End: 2017-06-05

## 2017-06-05 DIAGNOSIS — C7A.026 MALIGNANT CARCINOID TUMOR OF RECTUM: Primary | ICD-10-CM

## 2017-06-05 DIAGNOSIS — Z15.09 LYNCH SYNDROME: ICD-10-CM

## 2017-06-05 NOTE — TELEPHONE ENCOUNTER
Returned pts call. No answer. LVM informing pt that order placed for CT chest and pathology reread not yet resulted.

## 2017-06-05 NOTE — TELEPHONE ENCOUNTER
----- Message from Christina Chaves sent at 6/5/2017 11:04 AM CDT -----  Contact: Patient  EAW- Patient would like an orders entered for a CT Chest without contrast so she can schedule the test. Patient would like to know if the redone appendix pathology report was received. Patient can be reached at [phone].

## 2017-06-13 ENCOUNTER — PATIENT MESSAGE (OUTPATIENT)
Dept: NEUROLOGY | Facility: HOSPITAL | Age: 42
End: 2017-06-13

## 2017-06-16 ENCOUNTER — TELEPHONE (OUTPATIENT)
Dept: NEUROLOGY | Facility: HOSPITAL | Age: 42
End: 2017-06-16

## 2017-06-16 NOTE — TELEPHONE ENCOUNTER
----- Message from Christina Chaves sent at 6/16/2017  8:51 AM CDT -----  Contact: Patient  EAW- Patient sent a message via [employer] in regards to her abnormal blood work. Patient has questions and  would like a call back today  At [phone].

## 2017-07-18 ENCOUNTER — DOCUMENTATION ONLY (OUTPATIENT)
Dept: NEUROLOGY | Facility: HOSPITAL | Age: 42
End: 2017-07-18

## 2017-07-18 DIAGNOSIS — C7A.020 MALIGNANT CARCINOID TUMOR OF THE APPENDIX: Primary | ICD-10-CM

## 2017-07-18 RX ORDER — CHOLESTYRAMINE 4 G/9G
4 POWDER, FOR SUSPENSION ORAL
Qty: 270 PACKET | Refills: 3 | Status: SHIPPED | OUTPATIENT
Start: 2017-07-18 | End: 2017-10-16

## 2017-07-18 RX ORDER — CHOLESTYRAMINE 4 G/9G
4 POWDER, FOR SUSPENSION ORAL
COMMUNITY
End: 2017-07-18 | Stop reason: SDUPTHER

## 2017-07-18 NOTE — TELEPHONE ENCOUNTER
----- Message from Christina Chaves sent at 7/17/2017  3:44 PM CDT -----  Contact: Patient  EAW- Patient is having Diarrhea and would like something to take. Patient is also having neurological symptoms also. Patient wants pathology report results.  Patient uses [facility] in [facility] phone number [phone]. Patient would like a call back at [phone].

## 2017-07-21 ENCOUNTER — TELEPHONE (OUTPATIENT)
Dept: NEUROLOGY | Facility: HOSPITAL | Age: 42
End: 2017-07-21

## 2017-07-21 NOTE — TELEPHONE ENCOUNTER
----- Message from Sonia Nevarez sent at 7/20/2017  9:15 AM CDT -----  Contact: Patient  EAW- Patient called to speak to Isidra odonnell. Contact number [phone]

## 2017-07-24 ENCOUNTER — TELEPHONE (OUTPATIENT)
Dept: NEUROLOGY | Facility: HOSPITAL | Age: 42
End: 2017-07-24

## 2017-07-24 NOTE — TELEPHONE ENCOUNTER
----- Message from Christina Chaves sent at 7/24/2017  3:41 PM CDT -----  Contact: Patient  EAW- Patient is returning the nurses call. Call back number is [phone].

## 2017-07-24 NOTE — TELEPHONE ENCOUNTER
Returned call and was not aboe to answer her questions about and immune response and taking plasma phresis and IV IGG.  Encouraged her to call Dr Alvarado on his cell phone for these questions and also ask him about a rectal MRI as opposed to and EUS and also 3 month versus 6 month discrepancy for her return visit.  She has the number and will call hime after clinic tomorrow.

## 2017-08-24 LAB
EXT 24 HR UR METANEPHRINE: ABNORMAL
EXT 24 HR UR NORMETANEPHRINE: ABNORMAL
EXT 24 HR UR NORMETANEPHRINE: ABNORMAL
EXT 25 HYDROXY VIT D2: ABNORMAL
EXT 25 HYDROXY VIT D3: ABNORMAL
EXT 5 HIAA 24 HR URINE: ABNORMAL
EXT 5 HIAA BLOOD: ABNORMAL
EXT ACTH: ABNORMAL
EXT AFP: ABNORMAL
EXT ALBUMIN: 3.7 (ref 3.6–5.1)
EXT ALKALINE PHOSPHATASE: 61 (ref 33–150)
EXT ALT: 12 (ref 29–?)
EXT AMYLASE: ABNORMAL
EXT ANTI ISLET CELL AB: ABNORMAL
EXT ANTI PARIETAL CELL AB: ABNORMAL
EXT ANTI THYROID AB: ABNORMAL
EXT AST: 10 (ref 10–30)
EXT BILIRUBIN DIRECT: ABNORMAL MG/DL
EXT BILIRUBIN TOTAL: 0.2 (ref 0.2–1.2)
EXT BK VIRUS DNA QN PCR: ABNORMAL
EXT BUN: 11 (ref 7–25)
EXT C PEPTIDE: ABNORMAL
EXT CA 125: ABNORMAL
EXT CA 19-9: ABNORMAL
EXT CA 27-29: ABNORMAL
EXT CALCITONIN: ABNORMAL
EXT CALCIUM: 8.7 (ref 8.6–10.2)
EXT CEA: ABNORMAL
EXT CHLORIDE: 106 (ref 98–110)
EXT CHOLESTEROL: ABNORMAL
EXT CHROMOGRANIN A: ABNORMAL
EXT CO2: 28 (ref 20–31)
EXT CREATININE UA: ABNORMAL
EXT CREATININE: 0.68 MG/DL (ref 0.5–1.1)
EXT CYCLOSPORONE LEVEL: ABNORMAL
EXT DOPAMINE: ABNORMAL
EXT EBV DNA BY PCR: ABNORMAL
EXT EPINEPHRINE: ABNORMAL
EXT FOLATE: ABNORMAL
EXT FREE T3: ABNORMAL
EXT FREE T4: ABNORMAL
EXT FSH: ABNORMAL
EXT GASTRIN RELEASING PEPTIDE: ABNORMAL
EXT GASTRIN RELEASING PEPTIDE: ABNORMAL
EXT GASTRIN: ABNORMAL
EXT GGT: ABNORMAL
EXT GHRELIN: ABNORMAL
EXT GLUCAGON: ABNORMAL
EXT GLUCOSE: 91 (ref 65–99)
EXT GROWTH HORMONE: ABNORMAL
EXT HCV RNA QUANT PCR: ABNORMAL
EXT HDL: ABNORMAL
EXT HEMATOCRIT: 39.5 (ref 35–45)
EXT HEMOGLOBIN A1C: ABNORMAL
EXT HEMOGLOBIN: 13.1 (ref 11.7–15.5)
EXT HISTAMINE 24 HR URINE: ABNORMAL
EXT HISTAMINE: ABNORMAL
EXT IGF-1: ABNORMAL
EXT IMMUNKNOW (NON-STIMULATED): ABNORMAL
EXT IMMUNKNOW (STIMULATED): ABNORMAL
EXT INR: ABNORMAL
EXT INSULIN: ABNORMAL
EXT LANREOTIDE LEVEL: ABNORMAL
EXT LDH, TOTAL: ABNORMAL
EXT LDL CHOLESTEROL: ABNORMAL
EXT LIPASE: ABNORMAL
EXT MAGNESIUM: ABNORMAL
EXT METANEPHRINE FREE PLASMA: ABNORMAL
EXT MOTILIN: ABNORMAL
EXT NEUROKININ A CAMB: ABNORMAL
EXT NEUROKININ A ISI: 22 PG/ML (ref 0–40)
EXT NEUROTENSIN: ABNORMAL
EXT NOREPINEPHRINE: ABNORMAL
EXT NORMETANEPHRINE: ABNORMAL
EXT NSE: <5 (ref 0–10.8)
EXT OCTREOTIDE LEVEL: ABNORMAL
EXT PANCREASTATIN CAMB: ABNORMAL
EXT PANCREASTATIN ISI: 62 PG/ML (ref 10–135)
EXT PANCREATIC POLYPEPTIDE: ABNORMAL
EXT PHOSPHORUS: ABNORMAL
EXT PLATELETS: 215 (ref 140–400)
EXT POTASSIUM: 4.2 (ref 3.5–5.3)
EXT PROGRAF LEVEL: ABNORMAL
EXT PROLACTIN: ABNORMAL
EXT PROTEIN TOTAL: 6 (ref 6.1–8.1)
EXT PROTEIN UA: ABNORMAL
EXT PT: ABNORMAL
EXT PTH, INTACT: ABNORMAL
EXT PTT: ABNORMAL
EXT RAPAMUNE LEVEL: ABNORMAL
EXT SEROTONIN: 23 (ref 56–244)
EXT SODIUM: 142 MMOL/L (ref 135–146)
EXT SOMATOSTATIN: ABNORMAL
EXT SUBSTANCE P: 431
EXT TRIGLYCERIDES: ABNORMAL
EXT TRYPTASE: ABNORMAL
EXT TSH: ABNORMAL
EXT URIC ACID: ABNORMAL
EXT URINE AMYLASE U/HR: ABNORMAL
EXT URINE AMYLASE U/L: ABNORMAL
EXT VASOACTIVE INTESTINAL POLYPEPTIDE: ABNORMAL
EXT VITAMIN B12: ABNORMAL
EXT VMA 24 HR URINE: ABNORMAL
EXT WBC: 4.5 (ref 3.8–10.8)
NEURON SPECIFIC ENOLASE: ABNORMAL

## 2017-08-29 ENCOUNTER — TELEPHONE (OUTPATIENT)
Dept: NEUROLOGY | Facility: HOSPITAL | Age: 42
End: 2017-08-29

## 2017-08-29 NOTE — TELEPHONE ENCOUNTER
----- Message from Christina Chaves sent at 8/28/2017 11:30 AM CDT -----  Contact: Patient  EAW- Patient is having a pain in the  right side of her back for 6 weeks and is now starting to Wheeze. Patient would like a call  back at [phone].

## 2017-08-29 NOTE — TELEPHONE ENCOUNTER
Returned call to patient and discussed her symptomes.  She has a nebulizer for the wheezing and has taken everything under the sun for the back pain.  Her markers from May 30 and normal and also from May 15.  encouraged her to take it easy and not do anything too strenuous and if things get too bad to go to the ED which she is reluctant to do.  She seemed satisfied just to talk and is waiting to take the gallium scan in October.

## 2017-09-11 DIAGNOSIS — Z91.041 CONTRAST MEDIA ALLERGY: Primary | ICD-10-CM

## 2017-09-11 RX ORDER — PREDNISONE 50 MG/1
50 TABLET ORAL DAILY
Qty: 3 TABLET | Refills: 2 | Status: SHIPPED | OUTPATIENT
Start: 2017-09-11 | End: 2018-09-04 | Stop reason: SDUPTHER

## 2017-09-11 NOTE — TELEPHONE ENCOUNTER
----- Message from Christina Chaves sent at 9/11/2017 10:21 AM CDT -----  Contact: Patient  EAW- Patient will need premeds called into [facility]'s pharmacy on [address] at [phone] because she is allergic to the contrast. Patient can be reached at [phone].

## 2017-09-14 ENCOUNTER — TELEPHONE (OUTPATIENT)
Dept: NEUROLOGY | Facility: HOSPITAL | Age: 42
End: 2017-09-14

## 2017-09-14 NOTE — TELEPHONE ENCOUNTER
----- Message from Sonia Nevarez sent at 9/13/2017  9:55 AM CDT -----  Contact: Patient  EAW- Patient is in pain upper right quadrant . Patient states that it feels like something is there. Pain is dull. Sharp pain in back about a month now. Patients contact number [phone]

## 2017-09-14 NOTE — TELEPHONE ENCOUNTER
Spoke with pt, she went to the ER yesterday and was admitted with abd pain.  They did a ct scan and did not see anything.  She is being discharged today.  Reviewed upcoming plan for kevin in Oct.  All questions answered.

## 2017-10-12 ENCOUNTER — HOSPITAL ENCOUNTER (OUTPATIENT)
Dept: RADIOLOGY | Facility: HOSPITAL | Age: 42
Discharge: HOME | End: 2017-10-12
Attending: SURGERY
Payer: COMMERCIAL

## 2017-10-12 DIAGNOSIS — C7A.026 MALIGNANT CARCINOID TUMOR OF RECTUM: ICD-10-CM

## 2017-10-12 DIAGNOSIS — Z15.09 LYNCH SYNDROME: ICD-10-CM

## 2017-10-12 DIAGNOSIS — C7A.020 MALIGNANT CARCINOID TUMOR OF APPENDIX: ICD-10-CM

## 2017-10-12 PROCEDURE — 25500020 PHARM REV CODE 255: Performed by: SURGERY

## 2017-10-12 PROCEDURE — 74177 CT ABD & PELVIS W/CONTRAST: CPT | Mod: 26,,, | Performed by: RADIOLOGY

## 2017-10-12 PROCEDURE — 74177 CT ABD & PELVIS W/CONTRAST: CPT | Mod: TC

## 2017-10-12 PROCEDURE — 71250 CT THORAX DX C-: CPT | Mod: TC

## 2017-10-12 PROCEDURE — 74183 MRI ABD W/O CNTR FLWD CNTR: CPT | Mod: TC

## 2017-10-12 PROCEDURE — 71250 CT THORAX DX C-: CPT | Mod: 26,,, | Performed by: RADIOLOGY

## 2017-10-12 PROCEDURE — A9585 GADOBUTROL INJECTION: HCPCS | Performed by: SURGERY

## 2017-10-12 PROCEDURE — 74183 MRI ABD W/O CNTR FLWD CNTR: CPT | Mod: 26,,, | Performed by: RADIOLOGY

## 2017-10-12 RX ORDER — GADOBUTROL 604.72 MG/ML
10 INJECTION INTRAVENOUS
Status: COMPLETED | OUTPATIENT
Start: 2017-10-12 | End: 2017-10-12

## 2017-10-12 RX ADMIN — IOHEXOL 30 ML: 350 INJECTION, SOLUTION INTRAVENOUS at 09:10

## 2017-10-12 RX ADMIN — IOHEXOL 75 ML: 350 INJECTION, SOLUTION INTRAVENOUS at 11:10

## 2017-10-12 RX ADMIN — GADOBUTROL 10 ML: 604.72 INJECTION INTRAVENOUS at 01:10

## 2017-10-13 ENCOUNTER — HOSPITAL ENCOUNTER (OUTPATIENT)
Dept: RADIOLOGY | Facility: HOSPITAL | Age: 42
Discharge: HOME | End: 2017-10-13
Attending: SURGERY
Payer: COMMERCIAL

## 2017-10-13 DIAGNOSIS — C7A.026 CARCINOID TUMOR, RECTAL, MALIGNANT: ICD-10-CM

## 2017-10-13 PROCEDURE — 78815 PET IMAGE W/CT SKULL-THIGH: CPT | Mod: 26,PI,, | Performed by: RADIOLOGY

## 2017-10-13 PROCEDURE — A9587 GALLIUM GA-68: HCPCS

## 2017-10-16 ENCOUNTER — OFFICE VISIT (OUTPATIENT)
Dept: NEUROLOGY | Facility: HOSPITAL | Age: 42
End: 2017-10-16
Attending: SURGERY
Payer: COMMERCIAL

## 2017-10-16 ENCOUNTER — TELEPHONE (OUTPATIENT)
Dept: PHARMACY | Facility: CLINIC | Age: 42
End: 2017-10-16

## 2017-10-16 VITALS
TEMPERATURE: 98 F | HEART RATE: 76 BPM | DIASTOLIC BLOOD PRESSURE: 82 MMHG | SYSTOLIC BLOOD PRESSURE: 125 MMHG | BODY MASS INDEX: 28.36 KG/M2 | WEIGHT: 154.13 LBS | HEIGHT: 62 IN

## 2017-10-16 DIAGNOSIS — C7A.026 MALIGNANT CARCINOID TUMOR OF RECTUM: ICD-10-CM

## 2017-10-16 DIAGNOSIS — C7A.020 MALIGNANT CARCINOID TUMOR OF APPENDIX: Primary | ICD-10-CM

## 2017-10-16 PROCEDURE — 99215 OFFICE O/P EST HI 40 MIN: CPT | Performed by: SURGERY

## 2017-10-16 RX ORDER — OCTREOTIDE ACETATE 1000 UG/ML
200 INJECTION INTRAVENOUS EVERY 8 HOURS
Status: CANCELLED | OUTPATIENT
Start: 2017-10-16

## 2017-10-16 NOTE — TELEPHONE ENCOUNTER
DOCUMENTATION ONLY   PA was submitted to ins.  10/18/2017  PA approved until 10/2018  Copay $100

## 2017-10-16 NOTE — PROGRESS NOTES
"NOLANETS:  [facility]  A collaboration between [facility] and [facility]    PATIENT: Celena Kumar  MRN: [id number]  DATE: 10/16/2017    Vitals:    10/16/17 0849   BP: 125/82   Pulse: 76   Temp: 98.3 °F (36.8 °C)   TempSrc: Oral   Weight: 69.9 kg (154 lb 1.6 oz)   Height: 5' 2" (1.575 m)      Last 2 Weight Measurements:   Wt Readings from Last 2 Encounters:   10/16/17 69.9 kg (154 lb 1.6 oz)   05/30/17 73 kg (161 lb)     BMI: Body mass index is 28.19 kg/m².    Karnofsky Score    Karnofsky Score:  70% - Cares for Self: Unable to Carry on Normal Activity or Active Work       Diagnosis:   1. Malignant carcinoid tumor of appendix    2. Malignant carcinoid tumor of rectum      Interval History: Ms. 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    S/P cholecystectomy 09/22/2016       Past Surgical History:   Procedure Laterality Date    Appendectomy & gallbladder  09/22/2016    NET    colonscopy  03/2017    Carcinoid tumor    HYSTERECTOMY      KNEE SURGERY      SHOULDER SURGERY         Review of patient's allergies indicates:   Allergen Reactions    Contrast media Hives    Demerol [meperidine] Shortness Of Breath    Dilaudid [hydromorphone (bulk)] Shortness Of Breath    Strattera [atomoxetine] Shortness Of Breath    Sulfa (sulfonamide antibiotics) Rash    Vancomycin analogues Hives       Current Outpatient Prescriptions   Medication Sig Dispense Refill    albuterol (VENTOLIN HFA) 90 mcg/actuation inhaler as needed.       azelastine 0.15 % (205.5 mcg) Spry as needed.       cetirizine (ZYRTEC) 10 MG tablet Take 10 mg by mouth once daily.      dextroamphetamine-amphetamine (ADDERALL XR) 30 MG 24 hr capsule as needed.       diazePAM " (VALIUM) 2 MG tablet as needed.       diphenhydrAMINE (BENADRYL) 50 MG tablet Take 1 tablet (50 mg total) by mouth As instructed for Itching. Take 50 mg 1 hour before scan 1 tablet 2    duloxetine (CYMBALTA) 20 MG capsule 30 mg once daily.       FLOVENT  mcg/actuation inhaler as needed.       inhalation spacing device (INSPIRACHAMBER)       lidocaine (LIDODERM) 5 % 3 patches as needed.       ondansetron (ZOFRAN-ODT) 4 MG TbDL DIS ONE T PO  Q 6 H PRN N      ranitidine (ZANTAC) 300 MG capsule Take 300 mg by mouth as needed.       sumatriptan (IMITREX) 50 MG tablet TK 1 T PO FOR MIGRAINE HEADACHE. MAY REPEAT IN 2 HOURS BUT NOT TO EXCEED 4 TS IN 24 HOURS/ as needed      zolpidem (AMBIEN) 10 mg Tab nightly as needed.        No current facility-administered medications for this visit.        Review of Systems     Number of Days per Week Number per Day   DIARRHEA 7 3-10   BRISTOL STOOL SCALE RATING Type 6 and type 7    FLUSHING occasional Constant tight chest   WHEEZING 3-4      WEIGHT GAIN/LOSS -10 lbs   ENERGY RATING (0-10) 10      Physical Exam deferred.     Pathology Data:  No new tissue    Laboratory Data:  Neuroendocrine Labs Latest Ref Rng & Units 8/24/2017 5/30/2017   EXT 5 HIAA BLOOD 0 - 22 ng/ml 23 (A) 7   EXT GASTRIN 0 - 100 pg/ml     EXT SEROTONIN 56 - 244 23 (A) <10   EXT CHROMOGRANIN A 0 - 15 ng/ml     EXT PANCREASTATIN JACQUELYN 10 - 135 pg/ml 62 20   EXT NEUROKININ A JACQUELYN 0 - 40 pg/ml 22 19   EXT ANTI ISLET CELL AB      EXT FOLATE >5.4 ng/ml     EXT VITAMIN B12 200 - 1,100 pg/ml     EXT SUBSTANCE P <1,700 431 26 (A)   EXT WBC 3.8 - 10.8 4.5    EXT HGB 11.7 - 15.5 13.1    EXT HCT 35 - 45 39.5    EXT PLATLETS 140 - 400 215    EXT GLUCOSE 65 - 99 91    EXT BUN 7 - 25 11    EXT CREATININE 0.5 - 1.10 mg/dL 0.68    EXT  - 146 mmol/L 142    EXT K 3.5 - 5.3 4.2    EXT CHLORIDE 98 - 110 106    EXT CO2 20 - 31 28    EXT CALCIUM 8.6 - 10.2 8.7    EXT PROTEIN, TOTAL 6.1 - 8.1 6 (A)    EXT ALBUMIN 3.6 -  5.1 3.7    EXT TOTAL BILIRUBIN 0.2 - 1.2 0.2    EXT ALK PHOSPHATASE 33 - 150 61    EXT SGOT (AST) 10 - 30 10    EXT ALT 29 12 (A)    NSE 0 - 10.8 ng/ml  18.1 (A)   EXT NSE 0 - 10.8 <5    Weight        Neuroendocrine Labs Latest Ref Rng & Units 5/30/2017 5/15/2017   EXT 5 HIAA BLOOD 0 - 22 ng/ml  14   EXT GASTRIN 0 - 100 pg/ml  17   EXT SEROTONIN 56 - 244  <10   EXT CHROMOGRANIN A 0 - 15 ng/ml  7   EXT PANCREASTATIN JACQUELYN 10 - 135 pg/ml  47   EXT NEUROKININ A JACQUELYN 0 - 40 pg/ml  14   EXT ANTI ISLET CELL AB   NEG   EXT FOLATE >5.4 ng/ml  6.8   EXT VITAMIN B12 200 - 1,100 pg/ml  463   EXT SUBSTANCE P <1,700     EXT WBC 3.8 - 10.8     EXT HGB 11.7 - 15.5     EXT HCT 35 - 45     EXT PLATLETS 140 - 400     EXT GLUCOSE 65 - 99     EXT BUN 7 - 25     EXT CREATININE 0.5 - 1.10 mg/dL     EXT  - 146 mmol/L     EXT K 3.5 - 5.3     EXT CHLORIDE 98 - 110     EXT CO2 20 - 31     EXT CALCIUM 8.6 - 10.2     EXT PROTEIN, TOTAL 6.1 - 8.1     EXT ALBUMIN 3.6 - 5.1     EXT TOTAL BILIRUBIN 0.2 - 1.2     EXT ALK PHOSPHATASE 33 - 150     EXT SGOT (AST) 10 - 30     EXT ALT 29     NSE 0 - 10.8 ng/ml  <5.0   EXT NSE 0 - 10.8     Weight  161 lbs          Radiology Data:  INDICATION:  Malignant carcinoid of the appendix and rectum. Appendix removed in 2016 and a rectal neuroendocrine tumor removed in 2017.    COMPARISON:  CT of the chest, abdomen, and pelvis 10/12/17 MRI abdomen 10/12/17. Nondiagnostic images from outside [facility] of 4/13/17.    TECHNIQUE: GA68 Dotatate PET-CT. 6.07 mCi of GA68 Dotatate was administered intravenously in the right antecubital fossa. After an approximately 60 min distribution time, PET/CT images were acquired from the vertex to the mid thigh. Transmission images were acquired to correct for attenuation using a whole body low-dose CT scan without contrast with the arms positioned above the patient's head.     FINDINGS:    Quality of the study: Adequate.     Physiologic uptake of the tracer within the pituitary  gland, salivary glands, adrenal glands, pancreas, GI, and  tracts. No abnormal foci of increased tracer uptake.    Incidental CT findings:  Surgical clip in the gallbladder fossa. Enteric contrast throughout the colon. Appendix is surgically absent.   Impression         No evidence of metastatic disease.     CT chest, abdomen and pelvis    Comparison: MRI abdomen    Chest: A there is no axillary lymphadenopathy.  There is no pneumothorax, pleural effusion or focal consolidation.    The osseous structures are unremarkable.    Abdomen/pelvis: The liver, spleen, adrenal glands, right kidney and pancreas are normal contrasted appearance.  The gallbladder has been removed.  There is a cyst within the lower pole the left kidney.    There is no evidence of bowel obstruction.    There is no evidence of abdominal or pelvic lymphadenopathy.  The osseous structures are unremarkable.   Impression      There is no evidence of metastatic disease.       FINDINGS:    Abdomen without contrast:  No fat or iron deposition in the liver or spleen.    Abdomen with contrast:  Lung bases are clear. Heart size is normal.    Liver is normal in size and contour. No focal hepatic lesion. No intrahepatic biliary ductal dilatation.    The gallbladder is surgically absent. The common bile duct is normal in caliber.    The pancreas demonstrates normal intrinsic T1 signal. No pancreatic lesion or pancreatic ductal dilatation.    Spleen is normal in size and signal.    Adrenal glands are normal in appearance.    Kidneys are symmetric and enhance normally without focal suspicious lesion. There is a simple cyst in the medial left kidney. No hydronephrosis.    The visualized bowel is unremarkable.    Portal, splenic, and superior mesenteric veins are patent.    Abdominal aorta is normal in caliber.    No enhancing bone lesions.   Impression         No abdominal metastatic disease.    Prior cholecystectomy.    Left renal cyst.            Impression:  1.   Has diarrhea but no evidence of tumor on gallium mri of ct scan or lab       Plan:resstage in 6 months-- consider rescue oa 200 micrograms tid for two week       Labs: Markers: 3 month intervals            Other: 12 month intervals    Scans: 6 month intervals    Return to Clinic:6 month intervals    Orders placed this visit: No orders of the defined types were placed in this encounter.       25 Minutes Face-to-Face; Counseling/Coordination of Care > 50 percent of Visit     Juan J Anaya MD, FACS  The [employer]up [employer] Professor of Surgery, [facility]  [address], [address]  [place]  [address]  Cell [phone]  ph. [phone]; [phone]  fax. [phone]  [email or web address]

## 2017-10-16 NOTE — LETTER
October 16, 2017        Oscar Bryan  [address]  [address]  [place] [address]       NOLANETS: [facility]  A collaboration between [facility] and [facility] [address]  [address]  [place] [address]  Phone: [phone]  Fax: [phone]        CHANEL Walton M.D., FACS  Valdo Miller M.D.  Stoney Smith M.D.   Alexus Pendleton M.D., FACS  Luis Godinez, DO Juan J Anaya M.D., FACS   Patient: Celena Kumar   MR Number: [id number]   YOB: 1975   Date of Visit: 10/16/2017     Dear Dr. Bryan    Thank you for the kind referral of Celena Kumar. We saw this patient on 10/16/2017, and a copy of our clinic note is enclosed. We certainly appreciate the opportunity to participate in your patient's care.     If you have any questions or wish to discuss your patient further, please do not hesitate to contact us at [phone].       Kindest personal regards,          Juan J Anaya MD, FACS  The Anup Medrano Professor of Surgery & Neurosciences  [facility], Dept. Of Surgery  [address], [address]  [place] [address] [phone]

## 2017-10-16 NOTE — PATIENT INSTRUCTIONS
Labs every 3 months-orders given to patient    CT, MRI prior to returning to clinic-call [phone] to schedule    Echocardiogram yearly    Return to clinic in 6 months-appointment scheduled    Have a flex sig done in March 2018 at home    Larissa Vanegas will give you a call to  Discuss recommendations    Octreotide Acetate administer 200 mcg three times a day for weeks and let us know your response to this treatment.    Instructions given for sub cutaneous injections.

## 2017-10-18 ENCOUNTER — TELEPHONE (OUTPATIENT)
Dept: NEUROLOGY | Facility: HOSPITAL | Age: 42
End: 2017-10-18

## 2017-10-18 NOTE — TELEPHONE ENCOUNTER
Patient having problems with diarrhea and abdominal pain. Message to contact patient sent by nurse. Called patient but no answer or answering machine to discuss symptom of diarrhea.  I discussed with nurse. In review of patient chart concern that mediaction side effects can be contributing to symptoms. This includes adderall XR which has documented side effect of diarrhea and nausea.

## 2017-10-27 DIAGNOSIS — C7A.020 MALIGNANT CARCINOID TUMOR OF APPENDIX: Primary | ICD-10-CM

## 2017-10-27 RX ORDER — LANREOTIDE ACETATE 120 MG/.5ML
120 INJECTION SUBCUTANEOUS
COMMUNITY

## 2017-10-27 RX ORDER — OCTREOTIDE ACETATE 1000 UG/ML
200 INJECTION INTRAVENOUS
COMMUNITY
End: 2017-10-27 | Stop reason: SDUPTHER

## 2017-10-27 RX ORDER — OCTREOTIDE ACETATE 1000 UG/ML
200 INJECTION INTRAVENOUS
Qty: 25 ML | Refills: 4 | Status: SHIPPED | OUTPATIENT
Start: 2017-10-27 | End: 2018-03-22 | Stop reason: SDUPTHER

## 2017-10-27 NOTE — TELEPHONE ENCOUNTER
Phoned patient and discussed the octreotide rescue injections and Dr Esqueda recommendations.  Will fax recs for Lanreotide 120 mg every 28 days to Dr Arias's office at [phone] and escribed more rescue injections to her [facility] pharmacy for Dr Anaya to approve.  Patient is very happy with the results of the rescue injections.

## 2017-10-27 NOTE — TELEPHONE ENCOUNTER
----- Message from Christina Chaves sent at 10/27/2017 11:18 AM CDT -----  Contact: Patient  EAW- Patient states she is doing well on the Octreotide injections and would like a call back to find out what the plan is for next week, when she will be out of the injections . Please call the patient back at [phone].

## 2017-10-27 NOTE — TELEPHONE ENCOUNTER
----- Message from Juan J Anaya MD sent at 10/27/2017 12:55 PM CDT -----  Contact: Patient  Start lanreotide 120 mg every 28 days and use rescue prn  ----- Message -----  From: Andreea Yang RN  Sent: 10/27/2017  11:25 AM  To: Andreea Yang RN, MD Dr Héctor Ferguson,  This patient was given a trial of the sandostatin rescue injections 3 times a day and they are working for her.  What is the next step please  ThanksIsidra  ----- Message -----  From: Christina Chaves  Sent: 10/27/2017  11:18 AM  To: Héctor Arita Staff    EAW- Patient states she is doing well on the Octreotide injections and would like a call back to find out what the plan is for next week, when she will be out of the injections . Please call the patient back at [phone].

## 2017-11-28 ENCOUNTER — TELEPHONE (OUTPATIENT)
Dept: NEUROLOGY | Facility: HOSPITAL | Age: 42
End: 2017-11-28

## 2017-11-28 NOTE — TELEPHONE ENCOUNTER
----- Message from Christina Chaves sent at 11/27/2017 11:07 AM CST -----  Contact: Patient  EAW- Patient is having significant diarrhea and would like to speak to the nurse. Call back number is [phone].

## 2017-11-28 NOTE — TELEPHONE ENCOUNTER
Returned call to patient to discuss what Dr weston stated about the sub q injections.  Informed her that he recommended that she stop all together or he said that she could keep taking them but if she was having a reaction he would stop.  She is very hesitant to stop because it is the only thing that is helping her diarrhea symptoms, she will try her thighs and also try ot back off to 2 times a day and she will keep a log.  She said that it might be because she is not letting it get to room temp and that she has very sensitive skin to begin with and it could be the alcohol also.  She will keep us posted.

## 2017-12-31 RX ORDER — PEN NEEDLE, DIABETIC 29 G X1/2"
NEEDLE, DISPOSABLE MISCELLANEOUS
Qty: 100 EACH | Refills: 0 | Status: SHIPPED | OUTPATIENT
Start: 2017-12-31 | End: 2018-04-17

## 2018-01-02 LAB
EXT 24 HR UR METANEPHRINE: ABNORMAL
EXT 24 HR UR NORMETANEPHRINE: ABNORMAL
EXT 24 HR UR NORMETANEPHRINE: ABNORMAL
EXT 25 HYDROXY VIT D2: ABNORMAL
EXT 25 HYDROXY VIT D3: ABNORMAL
EXT 5 HIAA 24 HR URINE: ABNORMAL
EXT 5 HIAA BLOOD: 25 NG/ML (ref 0–22)
EXT ACTH: ABNORMAL
EXT AFP: ABNORMAL
EXT ALBUMIN: 3.9 G/DL (ref 3.6–5.1)
EXT ALKALINE PHOSPHATASE: 54 U/L (ref 33–115)
EXT ALT: 12 U/L (ref 6–29)
EXT AMYLASE: ABNORMAL
EXT ANTI ISLET CELL AB: ABNORMAL
EXT ANTI PARIETAL CELL AB: ABNORMAL
EXT ANTI THYROID AB: ABNORMAL
EXT AST: 13 U/L (ref 10–30)
EXT BILIRUBIN DIRECT: ABNORMAL MG/DL
EXT BILIRUBIN TOTAL: 0.2 MG/DL (ref 0.2–1.2)
EXT BK VIRUS DNA QN PCR: ABNORMAL
EXT BUN: 11 MG/DL (ref 7–25)
EXT C PEPTIDE: ABNORMAL
EXT CA 125: ABNORMAL
EXT CA 19-9: ABNORMAL
EXT CA 27-29: ABNORMAL
EXT CALCITONIN: ABNORMAL
EXT CALCIUM: 9 MG/DL (ref 8.6–10.2)
EXT CEA: ABNORMAL
EXT CHLORIDE: 105 MMOL/L (ref 98–110)
EXT CHOLESTEROL: ABNORMAL
EXT CHROMOGRANIN A: ABNORMAL
EXT CO2: 29 MMOL/L (ref 20–31)
EXT CREATININE UA: ABNORMAL
EXT CREATININE: 0.67 MG/DL (ref 0.5–1.1)
EXT CYCLOSPORONE LEVEL: ABNORMAL
EXT DOPAMINE: ABNORMAL
EXT EBV DNA BY PCR: ABNORMAL
EXT EPINEPHRINE: ABNORMAL
EXT FOLATE: ABNORMAL
EXT FREE T3: ABNORMAL
EXT FREE T4: ABNORMAL
EXT FSH: ABNORMAL
EXT GASTRIN RELEASING PEPTIDE: ABNORMAL
EXT GASTRIN RELEASING PEPTIDE: ABNORMAL
EXT GASTRIN: ABNORMAL
EXT GGT: ABNORMAL
EXT GHRELIN: ABNORMAL
EXT GLUCAGON: ABNORMAL
EXT GLUCOSE: 88 MG/DL (ref 65–99)
EXT GROWTH HORMONE: ABNORMAL
EXT HCV RNA QUANT PCR: ABNORMAL
EXT HDL: ABNORMAL
EXT HEMATOCRIT: 39.4 % (ref 35–45)
EXT HEMOGLOBIN A1C: ABNORMAL
EXT HEMOGLOBIN: 13.6 G/DL (ref 11.7–15.5)
EXT HISTAMINE 24 HR URINE: ABNORMAL
EXT HISTAMINE: ABNORMAL
EXT IGF-1: ABNORMAL
EXT IMMUNKNOW (NON-STIMULATED): ABNORMAL
EXT IMMUNKNOW (STIMULATED): ABNORMAL
EXT INR: ABNORMAL
EXT INSULIN: ABNORMAL
EXT LANREOTIDE LEVEL: ABNORMAL
EXT LDH, TOTAL: ABNORMAL
EXT LDL CHOLESTEROL: ABNORMAL
EXT LIPASE: ABNORMAL
EXT MAGNESIUM: ABNORMAL
EXT METANEPHRINE FREE PLASMA: ABNORMAL
EXT MOTILIN: ABNORMAL
EXT NEUROKININ A CAMB: ABNORMAL
EXT NEUROKININ A ISI: 22 PG/ML (ref 0–40)
EXT NEUROTENSIN: ABNORMAL
EXT NOREPINEPHRINE: ABNORMAL
EXT NORMETANEPHRINE: ABNORMAL
EXT NSE: ABNORMAL
EXT OCTREOTIDE LEVEL: ABNORMAL
EXT PANCREASTATIN CAMB: ABNORMAL
EXT PANCREASTATIN ISI: 50 PG/ML (ref 10–135)
EXT PANCREATIC POLYPEPTIDE: ABNORMAL
EXT PHOSPHORUS: ABNORMAL
EXT PLATELETS: 253 1000/UL (ref 140–400)
EXT POTASSIUM: 4.5 MMOL/L (ref 3.5–5.3)
EXT PROGRAF LEVEL: ABNORMAL
EXT PROLACTIN: ABNORMAL
EXT PROTEIN TOTAL: 6.4 G/DL (ref 6.1–8.1)
EXT PROTEIN UA: ABNORMAL
EXT PT: ABNORMAL
EXT PTH, INTACT: ABNORMAL
EXT PTT: ABNORMAL
EXT RAPAMUNE LEVEL: ABNORMAL
EXT SEROTONIN: 76 NG/ML (ref 56–244)
EXT SODIUM: 141 MMOL/L (ref 135–146)
EXT SOMATOSTATIN: ABNORMAL
EXT SUBSTANCE P: 413 PG/ML (ref 0–1700)
EXT TRIGLYCERIDES: ABNORMAL
EXT TRYPTASE: ABNORMAL
EXT TSH: ABNORMAL
EXT URIC ACID: ABNORMAL
EXT URINE AMYLASE U/HR: ABNORMAL
EXT URINE AMYLASE U/L: ABNORMAL
EXT VASOACTIVE INTESTINAL POLYPEPTIDE: ABNORMAL
EXT VITAMIN B12: ABNORMAL
EXT VMA 24 HR URINE: ABNORMAL
EXT WBC: 6.1 1000/UL (ref 3.8–10.8)
NEURON SPECIFIC ENOLASE: 11.3 NG/ML (ref 0–10.8)

## 2018-03-13 ENCOUNTER — TELEPHONE (OUTPATIENT)
Dept: NEUROLOGY | Facility: HOSPITAL | Age: 43
End: 2018-03-13

## 2018-03-15 ENCOUNTER — TELEPHONE (OUTPATIENT)
Dept: NEUROLOGY | Facility: HOSPITAL | Age: 43
End: 2018-03-15

## 2018-03-15 NOTE — TELEPHONE ENCOUNTER
----- Message from Christina Chaves sent at 3/15/2018 11:05 AM CDT -----  Contact: Patient  EAW- Patient returned the nurses call 2 days ago and has not received a call back. Becca would like a call back today at  [phone]. 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

## 2018-03-15 NOTE — TELEPHONE ENCOUNTER
----- Message from Christina Chaves sent at 3/13/2018 12:55 PM CDT -----  Contact: Patient  EAW- Patient is returning a missed call form the nurse. Call back number is [phone].

## 2018-03-15 NOTE — TELEPHONE ENCOUNTER
Returned call and discussed the questions she wanted to know from Dr Anaya.  Phoned Dr Anaya with the questions.  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 She verbalized understanding and informed us that she was having the upper EUS done tomorrow and she will get us the results.

## 2018-03-19 ENCOUNTER — TELEPHONE (OUTPATIENT)
Dept: NEUROLOGY | Facility: HOSPITAL | Age: 43
End: 2018-03-19

## 2018-03-19 NOTE — TELEPHONE ENCOUNTER
Received call from her pharmacy, spoke to Adrienne Sinclair Pharmacist and gave her a phone prescription refill for the patients rescue injections, 3 month supply with 3 refills.

## 2018-03-20 LAB
EXT 24 HR UR METANEPHRINE: NORMAL
EXT 24 HR UR NORMETANEPHRINE: NORMAL
EXT 24 HR UR NORMETANEPHRINE: NORMAL
EXT 25 HYDROXY VIT D2: NORMAL
EXT 25 HYDROXY VIT D3: NORMAL
EXT 5 HIAA 24 HR URINE: NORMAL
EXT 5 HIAA BLOOD: 10 NG/ML (ref 0–22)
EXT ACTH: NORMAL
EXT AFP: NORMAL
EXT ALBUMIN: 4.2 G/DL (ref 3.6–5.1)
EXT ALKALINE PHOSPHATASE: 62 U/L (ref 33–115)
EXT ALT: 10 U/L (ref 6–29)
EXT AMYLASE: NORMAL
EXT ANTI ISLET CELL AB: NORMAL
EXT ANTI PARIETAL CELL AB: NORMAL
EXT ANTI THYROID AB: NORMAL
EXT AST: 11 U/L (ref 10–30)
EXT BILIRUBIN DIRECT: NORMAL
EXT BILIRUBIN TOTAL: 0.5 MG/DL (ref 0.2–1.2)
EXT BK VIRUS DNA QN PCR: NORMAL
EXT BUN: 15 MG/DL (ref 7–25)
EXT C PEPTIDE: NORMAL
EXT CA 125: NORMAL
EXT CA 19-9: NORMAL
EXT CA 27-29: NORMAL
EXT CALCITONIN: NORMAL
EXT CALCIUM: 9.3 MG/DL (ref 8.6–10.2)
EXT CEA: NORMAL
EXT CHLORIDE: 103 MMOL/L (ref 98–110)
EXT CHOLESTEROL: NORMAL
EXT CHROMOGRANIN A: NORMAL
EXT CO2: 31 MMOL/L (ref 20–31)
EXT CREATININE UA: NORMAL
EXT CREATININE: 0.68 MG/DL (ref 0.5–1.1)
EXT CYCLOSPORONE LEVEL: NORMAL
EXT DOPAMINE: NORMAL
EXT EBV DNA BY PCR: NORMAL
EXT EPINEPHRINE: NORMAL
EXT FOLATE: NORMAL
EXT FREE T3: NORMAL
EXT FREE T4: NORMAL
EXT FSH: NORMAL
EXT GASTRIN RELEASING PEPTIDE: NORMAL
EXT GASTRIN RELEASING PEPTIDE: NORMAL
EXT GASTRIN: NORMAL
EXT GGT: NORMAL
EXT GHRELIN: NORMAL
EXT GLUCAGON: NORMAL
EXT GLUCOSE: 89 MG/DL (ref 65–99)
EXT GROWTH HORMONE: NORMAL
EXT HCV RNA QUANT PCR: NORMAL
EXT HDL: NORMAL
EXT HEMATOCRIT: 41.2 % (ref 35–45)
EXT HEMOGLOBIN A1C: NORMAL
EXT HEMOGLOBIN: 14.1 G/DL (ref 11.7–15.5)
EXT HISTAMINE 24 HR URINE: NORMAL
EXT HISTAMINE: NORMAL
EXT IGF-1: NORMAL
EXT IMMUNKNOW (NON-STIMULATED): NORMAL
EXT IMMUNKNOW (STIMULATED): NORMAL
EXT INR: NORMAL
EXT INSULIN: NORMAL
EXT LANREOTIDE LEVEL: NORMAL
EXT LDH, TOTAL: NORMAL
EXT LDL CHOLESTEROL: NORMAL
EXT LIPASE: NORMAL
EXT MAGNESIUM: NORMAL
EXT METANEPHRINE FREE PLASMA: NORMAL
EXT MOTILIN: NORMAL
EXT NEUROKININ A CAMB: NORMAL
EXT NEUROKININ A ISI: 18 PG/ML (ref 0–40)
EXT NEUROTENSIN: NORMAL
EXT NOREPINEPHRINE: NORMAL
EXT NORMETANEPHRINE: NORMAL
EXT NSE: NORMAL
EXT OCTREOTIDE LEVEL: NORMAL
EXT PANCREASTATIN CAMB: NORMAL
EXT PANCREASTATIN ISI: 63 PG/ML (ref 10–135)
EXT PANCREATIC POLYPEPTIDE: NORMAL
EXT PHOSPHORUS: NORMAL
EXT PLATELETS: 277 1000/UL (ref 140–400)
EXT POTASSIUM: 3.9 MMOL/L (ref 3.5–5.3)
EXT PROGRAF LEVEL: NORMAL
EXT PROLACTIN: NORMAL
EXT PROTEIN TOTAL: 6.8 G/DL (ref 6.1–8.1)
EXT PROTEIN UA: NORMAL
EXT PT: NORMAL
EXT PTH, INTACT: NORMAL
EXT PTT: NORMAL
EXT RAPAMUNE LEVEL: NORMAL
EXT SEROTONIN: 84 NG/ML (ref 56–244)
EXT SODIUM: 140 MMOL/L (ref 135–146)
EXT SOMATOSTATIN: NORMAL
EXT SUBSTANCE P: NORMAL
EXT TRIGLYCERIDES: NORMAL
EXT TRYPTASE: NORMAL
EXT TSH: NORMAL
EXT URIC ACID: NORMAL
EXT URINE AMYLASE U/HR: NORMAL
EXT URINE AMYLASE U/L: NORMAL
EXT VASOACTIVE INTESTINAL POLYPEPTIDE: NORMAL
EXT VITAMIN B12: NORMAL
EXT VMA 24 HR URINE: NORMAL
EXT WBC: 5.6 1000/UL (ref 3.8–10.8)
NEURON SPECIFIC ENOLASE: <5 NG/ML (ref 0–10.8)

## 2018-03-21 DIAGNOSIS — C7A.020 MALIGNANT CARCINOID TUMOR OF APPENDIX: ICD-10-CM

## 2018-03-21 NOTE — TELEPHONE ENCOUNTER
----- Message from Christina Chaves sent at 3/21/2018  1:33 PM CDT -----  Contact: Ruby Machuca- Brigette was unable to do the Substance P test because specimen was not frozen. Ruby with [facility] [phone].

## 2018-03-22 RX ORDER — OCTREOTIDE ACETATE 1000 UG/ML
200 INJECTION INTRAVENOUS
Qty: 25 ML | Refills: 4 | Status: SHIPPED | OUTPATIENT
Start: 2018-03-22 | End: 2019-05-28 | Stop reason: SDUPTHER

## 2018-04-02 ENCOUNTER — TELEPHONE (OUTPATIENT)
Dept: NEUROLOGY | Facility: HOSPITAL | Age: 43
End: 2018-04-02

## 2018-04-02 NOTE — TELEPHONE ENCOUNTER
Neck & chest itching and turning red. Took benadryl Caland pepcid AC, it got better but now is returning.  Encouraged to call Dr. Anaya with concerns.

## 2018-04-03 LAB

## 2018-04-16 ENCOUNTER — HOSPITAL ENCOUNTER (OUTPATIENT)
Dept: RADIOLOGY | Facility: HOSPITAL | Age: 43
Discharge: HOME | End: 2018-04-16
Attending: SURGERY
Payer: COMMERCIAL

## 2018-04-16 ENCOUNTER — TELEPHONE (OUTPATIENT)
Dept: NEUROLOGY | Facility: HOSPITAL | Age: 43
End: 2018-04-16

## 2018-04-16 DIAGNOSIS — C7A.026 MALIGNANT CARCINOID TUMOR OF RECTUM: ICD-10-CM

## 2018-04-16 DIAGNOSIS — C7A.020 MALIGNANT CARCINOID TUMOR OF APPENDIX: ICD-10-CM

## 2018-04-16 PROCEDURE — 74183 MRI ABD W/O CNTR FLWD CNTR: CPT | Mod: 26,,, | Performed by: RADIOLOGY

## 2018-04-16 PROCEDURE — 25500020 PHARM REV CODE 255: Performed by: SURGERY

## 2018-04-16 PROCEDURE — 74183 MRI ABD W/O CNTR FLWD CNTR: CPT | Mod: TC

## 2018-04-16 PROCEDURE — A9585 GADOBUTROL INJECTION: HCPCS | Performed by: SURGERY

## 2018-04-16 RX ORDER — GADOBUTROL 604.72 MG/ML
10 INJECTION INTRAVENOUS
Status: COMPLETED | OUTPATIENT
Start: 2018-04-16 | End: 2018-04-16

## 2018-04-16 RX ADMIN — GADOBUTROL 10 ML: 604.72 INJECTION INTRAVENOUS at 10:04

## 2018-04-16 NOTE — TELEPHONE ENCOUNTER
patient here to have scans done for appointment with Dr Anaya tomorrow.  CT scan canceled for today because she did not take prednisone  For contrast allergy.  Ct will put her on tomorrow for the CT scan and she can just come to the clinic after the CT to see Dr Anaya.  Informed patient of this by .

## 2018-04-17 ENCOUNTER — HOSPITAL ENCOUNTER (OUTPATIENT)
Dept: RADIOLOGY | Facility: HOSPITAL | Age: 43
Discharge: HOME | End: 2018-04-17
Attending: SURGERY
Payer: COMMERCIAL

## 2018-04-17 ENCOUNTER — OFFICE VISIT (OUTPATIENT)
Dept: NEUROLOGY | Facility: HOSPITAL | Age: 43
End: 2018-04-17
Attending: SURGERY
Payer: COMMERCIAL

## 2018-04-17 VITALS
WEIGHT: 146.81 LBS | DIASTOLIC BLOOD PRESSURE: 84 MMHG | HEIGHT: 62 IN | BODY MASS INDEX: 27.02 KG/M2 | RESPIRATION RATE: 18 BRPM | HEART RATE: 72 BPM | TEMPERATURE: 97 F | SYSTOLIC BLOOD PRESSURE: 122 MMHG

## 2018-04-17 DIAGNOSIS — Z15.09 LYNCH SYNDROME: ICD-10-CM

## 2018-04-17 DIAGNOSIS — C7A.026 MALIGNANT CARCINOID TUMOR OF RECTUM: ICD-10-CM

## 2018-04-17 DIAGNOSIS — C7A.020 MALIGNANT CARCINOID TUMOR OF APPENDIX: ICD-10-CM

## 2018-04-17 DIAGNOSIS — D49.89 NEOPLASM OF ABDOMEN: Primary | ICD-10-CM

## 2018-04-17 LAB

## 2018-04-17 PROCEDURE — 99215 OFFICE O/P EST HI 40 MIN: CPT | Mod: 25 | Performed by: SURGERY

## 2018-04-17 PROCEDURE — 74177 CT ABD & PELVIS W/CONTRAST: CPT | Mod: 26,,, | Performed by: RADIOLOGY

## 2018-04-17 PROCEDURE — 74177 CT ABD & PELVIS W/CONTRAST: CPT | Mod: TC

## 2018-04-17 PROCEDURE — 25500020 PHARM REV CODE 255: Performed by: SURGERY

## 2018-04-17 RX ORDER — FLUTICASONE PROPIONATE AND SALMETEROL 250; 50 UG/1; UG/1
1 POWDER RESPIRATORY (INHALATION) 2 TIMES DAILY
COMMUNITY

## 2018-04-17 RX ORDER — FLUTICASONE PROPIONATE 50 MCG
2 SPRAY, SUSPENSION (ML) NASAL DAILY
COMMUNITY

## 2018-04-17 RX ORDER — PEN NEEDLE, DIABETIC 30 GX3/16"
NEEDLE, DISPOSABLE MISCELLANEOUS
COMMUNITY

## 2018-04-17 RX ADMIN — IOHEXOL 75 ML: 350 INJECTION, SOLUTION INTRAVENOUS at 09:04

## 2018-04-17 RX ADMIN — IOHEXOL 30 ML: 350 INJECTION, SOLUTION INTRAVENOUS at 07:04

## 2018-04-17 NOTE — PROGRESS NOTES
"NOLANETS:  [facility]  A collaboration between [facility] and [facility]    PATIENT: Celena Kumar  MRN: [id number]  DATE: 4/17/2018    Vitals:    04/17/18 0935   BP: 122/84   Pulse: 72   Resp: 18   Temp: 97.3 °F (36.3 °C)   TempSrc: Oral   Weight: 66.6 kg (146 lb 13.2 oz)   Height: 5' 2" (1.575 m)      Last 2 Weight Measurements:   Wt Readings from Last 2 Encounters:   04/17/18 66.6 kg (146 lb 13.2 oz)   10/16/17 69.9 kg (154 lb 1.6 oz)     BMI: Body mass index is 26.85 kg/m².    Karnofsky Score    Karnofsky Score:  50% - Requires Considerable Assistance and Frequent Medical Care       Diagnosis:   1. Neoplasm of abdomen    2. Martin syndrome    3. Malignant carcinoid tumor of appendix    4. Malignant carcinoid tumor of rectum      Interval History: Ms. 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    Knee pain     left    PMS2-related Martin syndrome (HNPCC4)     S/P cholecystectomy 09/22/2016    Torn ligament     right thumb ulna collateral ligament       Past Surgical History:   Procedure Laterality Date    Appendectomy & gallbladder  09/22/2016    NET    colonscopy  03/2017    Carcinoid tumor    HYSTERECTOMY      KNEE SURGERY      lipoma removal      L and R thigh and lower back    SHOULDER SURGERY         Review of patient's allergies indicates:   Allergen Reactions    Adhesive Rash    Contrast media Hives    Demerol [meperidine] Shortness Of Breath    Dilaudid [hydromorphone (bulk)] Shortness Of Breath    Strattera [atomoxetine] Shortness Of Breath    Sulfa (sulfonamide antibiotics) Rash    Vancomycin analogues Hives       Current Outpatient Prescriptions " "  Medication Sig Dispense Refill    albuterol (VENTOLIN HFA) 90 mcg/actuation inhaler as needed.       cetirizine (ZYRTEC) 10 MG tablet Take 10 mg by mouth once daily.      dextroamphetamine-amphetamine (ADDERALL XR) 30 MG 24 hr capsule as needed.       diazePAM (VALIUM) 2 MG tablet as needed.       diphenhydrAMINE (BENADRYL) 50 MG tablet Take 1 tablet (50 mg total) by mouth As instructed for Itching. Take 50 mg 1 hour before scan 1 tablet 2    fluticasone (FLONASE) 50 mcg/actuation nasal spray 2 sprays by Each Nare route once daily.      fluticasone-salmeterol 250-50 mcg/dose (ADVAIR) 250-50 mcg/dose diskus inhaler Inhale 1 puff into the lungs 2 (two) times daily. Controller      inhalation spacing device (INSPIRACHAMBER)       lanreotide (SOMATULINE DEPOT) 120 mg/0.5 mL Syrg Inject 120 mg into the skin every 28 days.      lidocaine (LIDODERM) 5 % 3 patches as needed.       octreotide (SANDOSTATIN) 1,000 mcg/mL Soln Inject 0.2 mLs (200 mcg total) into the skin as needed (up to three times a day). Dispense 5 vials total and refill 4 times 25 mL 4    ondansetron (ZOFRAN-ODT) 4 MG TbDL DIS ONE T PO  Q 6 H PRN N      pen needle, diabetic 31 gauge x 5/16" Ndle by Misc.(Non-Drug; Combo Route) route.      ranitidine (ZANTAC) 300 MG capsule Take 300 mg by mouth as needed.       sumatriptan (IMITREX) 50 MG tablet TK 1 T PO FOR MIGRAINE HEADACHE. MAY REPEAT IN 2 HOURS BUT NOT TO EXCEED 4 TS IN 24 HOURS/ as needed      zolpidem (AMBIEN) 10 mg Tab nightly as needed.        No current facility-administered medications for this visit.        Review of Systems     Number of Days per Week Number per Day   DIARRHEA 3 10   BRISTOL STOOL SCALE RATING Type 6 and type 7    FLUSHING 0    WHEEZING 0 Sob --1 flight or less     WEIGHT GAIN/LOSS 154 to 146--today 146   ENERGY RATING (0-10) 5        Physical Exam: deferred.     Pathology Data:   no new tissue    Laboratory Data:  Neuroendocrine Labs Latest Ref Rng & Units " 3/20/2018 1/2/2018   EXT 5 HIAA BLOOD 0 - 22 ng/ml 10 25 (A)   EXT GASTRIN 0 - 100 pg/ml     EXT SEROTONIN 56 - 244 ng/ml 84 76   EXT CHROMOGRANIN A 0 - 15 ng/ml     EXT PANCREASTATIN JACQUELYN 10 - 135 pg/ml 63 50   EXT NEUROKININ A JACQUELYN 0 - 40 pg/ml 18 22   EXT ANTI ISLET CELL AB      EXT FOLATE >5.4 ng/ml     EXT VITAMIN B12 200 - 1,100 pg/ml     EXT SUBSTANCE P 6 - 29 u/l 10 413   EXT WBC 3.8 - 10.8 1000/ul 5.6 6.1   EXT HGB 11.7 - 15.5 g/dl 14.1 13.6   EXT HCT 35.0 - 45.0 % 41.2 39.4   EXT PLATLETS 140 - 400 1000/ul 277 253   EXT GLUCOSE 65 - 99 mg/dl 89 88   EXT BUN 7 - 25 mg/dl 15 11   EXT CREATININE 0.50 - 1.10 mg/dl 0.68 0.67   EXT  - 146 mmol/l 140 141   EXT K 3.5 - 5.3 mmol/l 3.9 4.5   EXT CHLORIDE 98 - 110 mmol/l 103 105   EXT CO2 20 - 31 mmol/l 31 29   EXT CALCIUM 8.6 - 10.2 mg/dl 9.3 9.0   EXT PROTEIN, TOTAL 6.1 - 8.1 g/dl 6.8 6.4   EXT ALBUMIN 3.6 - 5.1 g/dl 4.2 3.9   EXT TOTAL BILIRUBIN 0.2 - 1.2 mg/dl 0.5 0.2   EXT ALK PHOSPHATASE 33 - 115 u/l 62 54   EXT SGOT (AST) 10 - 30 u/l 11 13   EXT ALT 6 - 29 u/l 10 12   NSE 0 - 10.8 ng/ml <5.0 11.3 (A)   EXT NSE 0 - 10.8     Weight              Radiology Data:  FINDINGS:  Heart: Normal in size. No pericardial effusion.    Lung Bases: Well aerated, without consolidation or pleural fluid.    Liver: Normal in size and attenuation, with no focal hepatic lesions.    Gallbladder: No calcified gallstones.    Bile Ducts: No evidence of dilated ducts.    Pancreas: No mass or peripancreatic fat stranding.    Spleen: Unremarkable.    Adrenals: Unremarkable.    Kidneys/ Ureters: Simple 1.6 cm left renal cyst noted.  Otherwise unremarkable.    Bladder: No evidence of wall thickening.    Reproductive organs: Status post hysterectomy.    GI Tract/Mesentery: No evidence of bowel obstruction or inflammation.  Copious stool seen within the ascending colon.    Peritoneal Space: No ascites. No free air.    Retroperitoneum: No significant adenopathy.    Abdominal wall:  Unremarkable.    Vasculature: No significant atherosclerosis or aneurysm.    Bones: No acute fracture.   Impression       No evidence for metastatic disease       FINDINGS:  No liver lesions identified.  Prior cholecystectomy.  Slight prominence to the common bile duct, unchanged.  Pancreatic duct is unremarkable.  No pancreatic mass identified.  The spleen, adrenal glands are unremarkable.  Small left renal cyst noted.  No renal masses or hydronephrosis.  No periaortic lymphadenopathy.  Main portal vein is unremarkable.  No marrow replacement process identified.   Impression       No evidence of metastatic disease.    Prior cholecystectomy.    Left renal cyst unchanged.               Impression:  1. Symptoms of diarrhea --has rash on neck at times -- sob but no wheezing  2. Uses octreotide but not imodium       Plan: try imodium 2 at night and 2 in am x 14 days and reevaluate       Labs: Markers: 3 month intervals             Scans: 6 month intervals    Scopes: 6 month intervals--alternate eus with flex sig every 6 months    Echocardiogram: 6 month intervals    Return to Clinic:6 month intervals    Orders placed this visit:   Orders Placed This Encounter   Procedures    CT Abdomen Pelvis With Contrast    5-HIAA Plasma (Neuoendocrine)    Serotonin serum    Pancreastatin    Neurokinin A    Substance P    Neuron Specific Enolase, Serum    Lanreotide Drug Levels        25 Minutes Face-to-Face; Counseling/Coordination of Care > 50 percent of Visit     Juan J Anaya MD, FACS  The Anup Medrano Professor of Surgery, [facility]  [address], [address]  [place]  [address]  Cell [phone]  ph. [phone]; [phone]  fax. [phone]  [email or web address]

## 2018-04-17 NOTE — PATIENT INSTRUCTIONS
Blood work / Lab work / Tumor markers every 3 mos.  Get lab work drawn at least 1 month prior to appointment. --- due June 2018 and September 2018    Scans:  CT Abd/Pelvis in 6 mos.  --- due in October 2018  Call Scheduling Department at [phone] to schedule scans prior to next appointment:    Call the office, let me know when it gets closer to have pre-meds called in    Return clinic appointment in 6 months with Dr. Anaya - appointment made    Echo every year - due May 2018     Alternate EUS and Flex Sig: rotate every 6 months --- due for an EUS in August 2018    Extra Labs: sent to [facility] today to have a Tryptase level drawn    Imodium: 2 before bedtime, 2 in the morning before your feet hit the floor, take this for this 2 weeks, if it does not help give us call

## 2018-04-17 NOTE — LETTER
April 17, 2018        Jaqui Arias MD  [address]  [address]  [facility]  [facility] [address]       NOLANETS: [facility]  A collaboration between [facility] and [facility] [address]  [address]  [place] [address]  Phone: [phone]  Fax: [phone]        CHANEL Walton M.D., FACS  Valdo Miller M.D.  Stoney Smith M.D.   Alexus Pendleton M.D., FACS  DO Juan J Dennis M.D., FACS   Patient: Celena Kumar   MR Number: [id number]   YOB: 1975   Date of Visit: 4/17/2018     Dear Dr. Arias    Thank you for the kind referral of Celena Kumar. We saw this patient on 4/17/2018, and a copy of our clinic note is enclosed. We certainly appreciate the opportunity to participate in your patient's care.     If you have any questions or wish to discuss your patient further, please do not hesitate to contact us at [phone].       Kindest personal regards,          Juan J Anaya MD, FACS  [facility]  [facility], Dept. Of Surgery  [address], [address]  [place] [address] [phone]

## 2018-04-18 ENCOUNTER — TELEPHONE (OUTPATIENT)
Dept: NEUROLOGY | Facility: HOSPITAL | Age: 43
End: 2018-04-18

## 2018-04-20 ENCOUNTER — PATIENT MESSAGE (OUTPATIENT)
Dept: NEUROLOGY | Facility: HOSPITAL | Age: 43
End: 2018-04-20

## 2018-05-10 ENCOUNTER — PATIENT MESSAGE (OUTPATIENT)
Dept: NEUROLOGY | Facility: HOSPITAL | Age: 43
End: 2018-05-10

## 2018-05-23 ENCOUNTER — TELEPHONE (OUTPATIENT)
Dept: NEUROLOGY | Facility: HOSPITAL | Age: 43
End: 2018-05-23

## 2018-05-23 NOTE — TELEPHONE ENCOUNTER
Nutrition Assessment for Medical Nutrition Therapy    Referring professional: Juan J Anaya M.D.     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    Knee pain     left    PMS2-related Martin syndrome (HNPCC4)     S/P cholecystectomy 09/22/2016    Torn ligament     right thumb ulna collateral ligament       Past Surgical History:   Procedure Laterality Date    Appendectomy & gallbladder  09/22/2016    NET    colonscopy  03/2017    Carcinoid tumor    HYSTERECTOMY      KNEE SURGERY      lipoma removal      L and R thigh and lower back    SHOULDER SURGERY            Pertinent Medications:   Current Outpatient Prescriptions on File Prior to Visit   Medication Sig Dispense Refill    albuterol (VENTOLIN HFA) 90 mcg/actuation inhaler as needed.       cetirizine (ZYRTEC) 10 MG tablet Take 10 mg by mouth once daily.      dextroamphetamine-amphetamine (ADDERALL XR) 30 MG 24 hr capsule as needed.       diazePAM (VALIUM) 2 MG tablet as needed.       diphenhydrAMINE (BENADRYL) 50 MG tablet Take 1 tablet (50 mg total) by mouth As instructed for Itching. Take 50 mg 1 hour before scan 1 tablet 2    fluticasone (FLONASE) 50 mcg/actuation nasal spray 2 sprays by Each Nare route once daily.      fluticasone-salmeterol 250-50 mcg/dose (ADVAIR) 250-50 mcg/dose diskus inhaler Inhale 1 puff into the lungs 2 (two) times daily. Controller      inhalation spacing device ([facility])       lanreotide (SOMATULINE DEPOT) 120 mg/0.5 mL Syrg Inject 120 mg into the skin every 28 days.      lidocaine (LIDODERM) 5 % 3 patches as needed.       octreotide (SANDOSTATIN) 1,000 mcg/mL Soln Inject 0.2 mLs (200 mcg total) into the  "skin as needed (up to three times a day). Dispense 5 vials total and refill 4 times 25 mL 4    ondansetron (ZOFRAN-ODT) 4 MG TbDL DIS ONE T PO  Q 6 H PRN N      pen needle, diabetic 31 gauge x 5/16" Ndle by Misc.(Non-Drug; Combo Route) route.      ranitidine (ZANTAC) 300 MG capsule Take 300 mg by mouth as needed.       sumatriptan (IMITREX) 50 MG tablet TK 1 T PO FOR MIGRAINE HEADACHE. MAY REPEAT IN 2 HOURS BUT NOT TO EXCEED 4 TS IN 24 HOURS/ as needed      zolpidem (AMBIEN) 10 mg Tab nightly as needed.        No current facility-administered medications on file prior to visit.        Food intolerances or allergies:   Review of patient's allergies indicates:   Allergen Reactions    Adhesive Rash    Contrast media Hives    Demerol [meperidine] Shortness Of Breath    Dilaudid [hydromorphone (bulk)] Shortness Of Breath    Strattera [atomoxetine] Shortness Of Breath    Sulfa (sulfonamide antibiotics) Rash    Vancomycin analogues Hives       Labs:    [unfilled]  No results found for: CHOL  No results found for: HDL  No results found for: LDLCALC  No results found for: TRIG  No results found for: CHOLHDL  No results found for: HGBA1C               Last 3 Weights:   Wt Readings from Last 3 Encounters:   04/17/18 66.6 kg (146 lb 13.2 oz)   10/16/17 69.9 kg (154 lb 1.6 oz)   05/30/17 73 kg (161 lb)       Weight status: consistently decreasing    Calculated Calorie needs:   25 kcal/kg (kcal): 1800 calories  Calculated Protein needs: 70-90 grams       Allergy Hx : Reports strong history of environmental allergen response but no dx food allergies .     We reviewed the list of  amine rich foods and identified many which she regularly consumes and to which she  has had a reaction( itching of tongue, itching in back of throat primarily, hives ) :  Bananas - twice per day   Yogurt - twice per day   Nuts - daily cashews and walnuts , peanuts   Avocado daily   Chocolate daily   Cured cheeses - daily   Chickpeas " "  Caffeinated carbonated beverages        Nutrition History     Meal patterns: 1-2 meals daily  Breakfast: yogurt and banana and toast   Lunch: chicken, avocado, beans   Dinner: "Hello Fresh" type items     Bowel Function :   Good response to Lanreotide and Octretide  In terms of flushing and stool management     Meal preparation/shopping: self    Nutrition beverages: mainly drinks water, drinks carbonated drinks     Dining out: infrequnt     Smoking/alcohol:  Social History   Substance Use Topics    Smoking status: Never Smoker    Smokeless tobacco: Not on file    Alcohol use Not on file         Patient Goal:  · Reduce histamine reaction by eliminating major sources in diet   · Minimize intake of foods which exacerbate GE Reflux         Discussion with patient included:   · Discussed list Amine Rich food and products "Nutrition and Gastropancreatic Neuroendocrine Tumors" Endocrinol Metab Clin N Am 39(2010) e509    · Dropping Acid by Dr Romain Barba - guide to improving symptoms of GE reflux with diet       Comprehensiongood   Patient demonstrated understanding:   Nutrition follow-up:  will follow up as needed    Counseling time: 1 Hour  "

## 2018-06-29 ENCOUNTER — PATIENT MESSAGE (OUTPATIENT)
Dept: NEUROLOGY | Facility: HOSPITAL | Age: 43
End: 2018-06-29

## 2018-07-03 LAB
EXT 24 HR UR METANEPHRINE: ABNORMAL
EXT 24 HR UR NORMETANEPHRINE: ABNORMAL
EXT 24 HR UR NORMETANEPHRINE: ABNORMAL
EXT 25 HYDROXY VIT D2: ABNORMAL
EXT 25 HYDROXY VIT D3: ABNORMAL
EXT 5 HIAA 24 HR URINE: ABNORMAL
EXT 5 HIAA BLOOD: ABNORMAL
EXT ACTH: ABNORMAL
EXT AFP: ABNORMAL
EXT ALBUMIN: 4.2 G/DL (ref 3.6–5.1)
EXT ALKALINE PHOSPHATASE: 60 U/L (ref 33–115)
EXT ALT: 10 U/L (ref 6–29)
EXT AMYLASE: ABNORMAL
EXT ANTI ISLET CELL AB: ABNORMAL
EXT ANTI PARIETAL CELL AB: ABNORMAL
EXT ANTI THYROID AB: ABNORMAL
EXT AST: 13 U/L (ref 10–30)
EXT BILIRUBIN DIRECT: ABNORMAL
EXT BILIRUBIN TOTAL: 0.4 MG/DL (ref 0.2–1.2)
EXT BK VIRUS DNA QN PCR: ABNORMAL
EXT BUN: 17 MG/DL (ref 7–25)
EXT C PEPTIDE: ABNORMAL
EXT CA 125: ABNORMAL
EXT CA 19-9: ABNORMAL
EXT CA 27-29: ABNORMAL
EXT CALCITONIN: ABNORMAL
EXT CALCIUM: 9.3 MG/DL (ref 8.6–10.2)
EXT CEA: ABNORMAL
EXT CHLORIDE: 103 MMOL/L (ref 98–110)
EXT CHOLESTEROL: ABNORMAL
EXT CHROMOGRANIN A: 61 NG/ML (ref 25–140)
EXT CO2: 27 MMOL/L (ref 20–31)
EXT CREATININE UA: ABNORMAL
EXT CREATININE: 0.76 MG/DL (ref 0.5–1.1)
EXT CYCLOSPORONE LEVEL: ABNORMAL
EXT DOPAMINE: ABNORMAL
EXT EBV DNA BY PCR: ABNORMAL
EXT EPINEPHRINE: ABNORMAL
EXT FOLATE: ABNORMAL
EXT FREE T3: ABNORMAL
EXT FREE T4: ABNORMAL
EXT FSH: 22.8 MIU/ML
EXT GASTRIN RELEASING PEPTIDE: ABNORMAL
EXT GASTRIN RELEASING PEPTIDE: ABNORMAL
EXT GASTRIN: 18 PG/ML (ref 0–100)
EXT GGT: ABNORMAL
EXT GHRELIN: ABNORMAL
EXT GLUCAGON: ABNORMAL
EXT GLUCOSE: 114 MG/DL (ref 65–99)
EXT GROWTH HORMONE: ABNORMAL
EXT HCV RNA QUANT PCR: ABNORMAL
EXT HDL: ABNORMAL
EXT HEMATOCRIT: 40.3 % (ref 35–45)
EXT HEMOGLOBIN A1C: ABNORMAL
EXT HEMOGLOBIN: 13.6 G/DL (ref 11.7–15.5)
EXT HISTAMINE 24 HR URINE: ABNORMAL
EXT HISTAMINE: <1.5 NG/ML (ref 0.1–1.8)
EXT IGF-1: ABNORMAL
EXT IMMUNKNOW (NON-STIMULATED): ABNORMAL
EXT IMMUNKNOW (STIMULATED): ABNORMAL
EXT INR: ABNORMAL
EXT INSULIN: ABNORMAL
EXT LANREOTIDE LEVEL: ABNORMAL
EXT LDH, TOTAL: ABNORMAL
EXT LDL CHOLESTEROL: ABNORMAL
EXT LIPASE: ABNORMAL
EXT MAGNESIUM: ABNORMAL
EXT METANEPHRINE FREE PLASMA: ABNORMAL
EXT MOTILIN: ABNORMAL
EXT NEUROKININ A CAMB: ABNORMAL
EXT NEUROKININ A ISI: ABNORMAL
EXT NEUROTENSIN: ABNORMAL
EXT NOREPINEPHRINE: ABNORMAL
EXT NORMETANEPHRINE: ABNORMAL
EXT NSE: ABNORMAL
EXT OCTREOTIDE LEVEL: ABNORMAL
EXT PANCREASTATIN CAMB: ABNORMAL
EXT PANCREASTATIN ISI: ABNORMAL
EXT PANCREATIC POLYPEPTIDE: 101 PG/ML (ref 61–1192)
EXT PHOSPHORUS: ABNORMAL
EXT PLATELETS: 247 1000/UL (ref 140–400)
EXT POTASSIUM: 4.1 MMOL/L (ref 3.5–5.3)
EXT PROGRAF LEVEL: ABNORMAL
EXT PROLACTIN: ABNORMAL
EXT PROTEIN TOTAL: 6.7 G/DL (ref 6.1–8.1)
EXT PROTEIN UA: ABNORMAL
EXT PT: ABNORMAL
EXT PTH, INTACT: 49 PG/ML (ref 14–64)
EXT PTT: ABNORMAL
EXT RAPAMUNE LEVEL: ABNORMAL
EXT SEROTONIN: 62 NG/ML (ref 56–244)
EXT SODIUM: 139 MMOL/L (ref 135–146)
EXT SOMATOSTATIN: ABNORMAL
EXT SUBSTANCE P: ABNORMAL
EXT TRIGLYCERIDES: ABNORMAL
EXT TRYPTASE: ABNORMAL
EXT TSH: ABNORMAL
EXT URIC ACID: ABNORMAL
EXT URINE AMYLASE U/HR: ABNORMAL
EXT URINE AMYLASE U/L: ABNORMAL
EXT VASOACTIVE INTESTINAL POLYPEPTIDE: <50 PG/ML (ref 0–75)
EXT VITAMIN B12: 402 PG/ML (ref 200–1100)
EXT VMA 24 HR URINE: ABNORMAL
EXT WBC: 5 1000/UL (ref 3.8–10.8)
NEURON SPECIFIC ENOLASE: 5.5 NG/ML (ref 0–10.8)

## 2018-07-13 ENCOUNTER — PATIENT MESSAGE (OUTPATIENT)
Dept: NEUROLOGY | Facility: HOSPITAL | Age: 43
End: 2018-07-13

## 2018-09-04 ENCOUNTER — PATIENT MESSAGE (OUTPATIENT)
Dept: NEUROLOGY | Facility: HOSPITAL | Age: 43
End: 2018-09-04

## 2018-09-04 DIAGNOSIS — Z91.041 CONTRAST MEDIA ALLERGY: ICD-10-CM

## 2018-09-04 RX ORDER — PREDNISONE 50 MG/1
50 TABLET ORAL DAILY
Qty: 3 TABLET | Refills: 0 | OUTPATIENT
Start: 2018-09-04 | End: 2018-09-13

## 2018-09-04 NOTE — TELEPHONE ENCOUNTER
Put in a refill for Prednisone to pre-medicate patient prior to CT Scan. Routed to Dr. Anaya for sign off.

## 2018-10-18 ENCOUNTER — PATIENT MESSAGE (OUTPATIENT)
Dept: NEUROLOGY | Facility: HOSPITAL | Age: 43
End: 2018-10-18

## 2018-10-24 ENCOUNTER — OFFICE VISIT (OUTPATIENT)
Dept: NEUROLOGY | Facility: HOSPITAL | Age: 43
End: 2018-10-24
Attending: SURGERY
Payer: COMMERCIAL

## 2018-10-24 VITALS
DIASTOLIC BLOOD PRESSURE: 80 MMHG | RESPIRATION RATE: 18 BRPM | HEIGHT: 62 IN | SYSTOLIC BLOOD PRESSURE: 117 MMHG | TEMPERATURE: 97 F | BODY MASS INDEX: 26.91 KG/M2 | HEART RATE: 103 BPM | WEIGHT: 146.25 LBS

## 2018-10-24 DIAGNOSIS — D49.89 NEOPLASM OF ABDOMEN: ICD-10-CM

## 2018-10-24 DIAGNOSIS — C7A.020 MALIGNANT CARCINOID TUMOR OF APPENDIX: Primary | ICD-10-CM

## 2018-10-24 DIAGNOSIS — C7A.026 MALIGNANT CARCINOID TUMOR OF RECTUM: ICD-10-CM

## 2018-10-24 LAB
EXT 24 HR UR METANEPHRINE: ABNORMAL
EXT 24 HR UR NORMETANEPHRINE: ABNORMAL
EXT 24 HR UR NORMETANEPHRINE: ABNORMAL
EXT 25 HYDROXY VIT D2: ABNORMAL
EXT 25 HYDROXY VIT D3: ABNORMAL
EXT 5 HIAA 24 HR URINE: ABNORMAL
EXT 5 HIAA BLOOD: 6 NG/ML (ref 0–22)
EXT ACTH: ABNORMAL
EXT AFP: ABNORMAL
EXT ALBUMIN: 4.1 G/DL (ref 3.6–5.1)
EXT ALKALINE PHOSPHATASE: 65 U/L (ref 33–115)
EXT ALT: 15 U/L (ref 6–29)
EXT AMYLASE: ABNORMAL
EXT ANTI ISLET CELL AB: ABNORMAL
EXT ANTI PARIETAL CELL AB: ABNORMAL
EXT ANTI THYROID AB: ABNORMAL
EXT AST: 13 U/L (ref 10–30)
EXT BILIRUBIN DIRECT: ABNORMAL MG/DL
EXT BILIRUBIN TOTAL: 0.2 MG/DL (ref 0.2–1.2)
EXT BK VIRUS DNA QN PCR: ABNORMAL
EXT BUN: 14 MG/DL (ref 7–25)
EXT C PEPTIDE: ABNORMAL
EXT CA 125: ABNORMAL
EXT CA 19-9: ABNORMAL
EXT CA 27-29: ABNORMAL
EXT CALCITONIN: <2 PG/ML (ref 0–5)
EXT CALCIUM: 8.9 MG/DL (ref 8.6–10.2)
EXT CEA: ABNORMAL
EXT CHLORIDE: 105 MMOL/L (ref 98–110)
EXT CHOLESTEROL: ABNORMAL
EXT CHROMOGRANIN A: ABNORMAL
EXT CO2: 28 MMOL/L (ref 20–32)
EXT CREATININE UA: ABNORMAL
EXT CREATININE: 0.76 MG/DL (ref 0.5–1.1)
EXT CYCLOSPORONE LEVEL: ABNORMAL
EXT DOPAMINE: ABNORMAL
EXT EBV DNA BY PCR: ABNORMAL
EXT EPINEPHRINE: ABNORMAL
EXT FOLATE: ABNORMAL
EXT FREE T3: ABNORMAL
EXT FREE T4: ABNORMAL
EXT FSH: ABNORMAL
EXT GASTRIN RELEASING PEPTIDE: ABNORMAL
EXT GASTRIN RELEASING PEPTIDE: ABNORMAL
EXT GASTRIN: 27 PG/ML (ref 0–100)
EXT GGT: ABNORMAL
EXT GHRELIN: ABNORMAL
EXT GLUCAGON: ABNORMAL
EXT GLUCOSE: 103 MG/DL (ref 65–99)
EXT GROWTH HORMONE: ABNORMAL
EXT HCV RNA QUANT PCR: ABNORMAL
EXT HDL: ABNORMAL
EXT HEMATOCRIT: 39.4 % (ref 35–45)
EXT HEMOGLOBIN A1C: 5.5 % (ref 0–5.7)
EXT HEMOGLOBIN: 13.3 G/DL (ref 11.7–15.5)
EXT HISTAMINE 24 HR URINE: ABNORMAL
EXT HISTAMINE: ABNORMAL
EXT IGF-1: ABNORMAL
EXT IMMUNKNOW (NON-STIMULATED): ABNORMAL
EXT IMMUNKNOW (STIMULATED): ABNORMAL
EXT INR: ABNORMAL
EXT INSULIN: ABNORMAL
EXT LANREOTIDE LEVEL: ABNORMAL
EXT LDH, TOTAL: ABNORMAL
EXT LDL CHOLESTEROL: ABNORMAL
EXT LIPASE: ABNORMAL
EXT MAGNESIUM: ABNORMAL
EXT METANEPHRINE FREE PLASMA: ABNORMAL
EXT MOTILIN: ABNORMAL
EXT NEUROKININ A CAMB: ABNORMAL
EXT NEUROKININ A ISI: 14 PG/ML (ref 0–40)
EXT NEUROTENSIN: ABNORMAL
EXT NOREPINEPHRINE: ABNORMAL
EXT NORMETANEPHRINE: ABNORMAL
EXT NSE: ABNORMAL
EXT OCTREOTIDE LEVEL: 1510 PG/ML
EXT PANCREASTATIN CAMB: ABNORMAL
EXT PANCREASTATIN ISI: 49 PG/ML (ref 10–135)
EXT PANCREATIC POLYPEPTIDE: ABNORMAL
EXT PHOSPHORUS: ABNORMAL
EXT PLATELETS: 309 1000/UL (ref 140–400)
EXT POTASSIUM: 4.1 MMOL/L (ref 3.5–5.3)
EXT PROGRAF LEVEL: ABNORMAL
EXT PROLACTIN: ABNORMAL
EXT PROTEIN TOTAL: 6.5 G/DL (ref 6.1–8.1)
EXT PROTEIN UA: ABNORMAL
EXT PT: ABNORMAL
EXT PTH, INTACT: ABNORMAL
EXT PTT: ABNORMAL
EXT RAPAMUNE LEVEL: ABNORMAL
EXT SEROTONIN: 60 NG/ML (ref 56–244)
EXT SODIUM: 141 MMOL/L (ref 135–146)
EXT SOMATOSTATIN: ABNORMAL
EXT SUBSTANCE P: 133 PG/ML (ref 40–270)
EXT TRIGLYCERIDES: ABNORMAL
EXT TRYPTASE: 5 NG/ML (ref 0–11)
EXT TSH: ABNORMAL
EXT URIC ACID: ABNORMAL
EXT URINE AMYLASE U/HR: ABNORMAL
EXT URINE AMYLASE U/L: ABNORMAL
EXT VASOACTIVE INTESTINAL POLYPEPTIDE: ABNORMAL
EXT VITAMIN B12: ABNORMAL
EXT VMA 24 HR URINE: ABNORMAL
EXT WBC: 5.9 1000/UL (ref 3.8–10.8)
NEURON SPECIFIC ENOLASE: <5 NG/ML (ref 0–10.8)

## 2018-10-24 PROCEDURE — 99215 OFFICE O/P EST HI 40 MIN: CPT | Performed by: SURGERY

## 2018-10-24 RX ORDER — FAMOTIDINE 10 MG/1
10 TABLET ORAL 2 TIMES DAILY
COMMUNITY

## 2018-10-24 NOTE — PATIENT INSTRUCTIONS
Blood work / Lab work / Tumor markers every 3 mos.  Get lab work drawn at least 1 month prior to appointment. --- due Today, January 2019 and April 2019    Scans:  CT Abd/Pelvis and MRI liver in 6 mos.  ---  Due in April 2019  Call Scheduling Department at [phone] to schedule scans prior to next appointment:      Return clinic appointment in 6 months with Dr. Anaya - appointment made    Alternate EUS and Flex Sig: rotate every 6 months  Proceed with EUS and Biopsy at this time  Flex Sig --- due in 6 months, which is March 2019 --- orders given to the patient    Echo: due in Summer 2019  Please work on getting a copy of your Echo that was done in Summer of 2018 fax to our office at [phone]

## 2018-10-24 NOTE — LETTER
October 24, 2018        Laverne Velez MD  [address]  [facility] [address]       NOLANETS: [facility]  A collaboration between [facility] and [facility] [address]  [address]  [place] [address]  Phone: [phone]  Fax: [phone]        CHANEL Walton M.D., FACS  Valdo Miller M.D.  Stoney Smith M.D.   Alexus Pendleton M.D., FACS  Luis Godinez, DO Juan J Anaya M.D., FACS   Patient: Celena Kumar   MR Number: [id number]   YOB: 1975   Date of Visit: 10/24/2018     Dear Dr. Velez    Thank you for the kind referral of Celena Kumar. We saw this patient on 10/24/2018, and a copy of our clinic note is enclosed. We certainly appreciate the opportunity to participate in your patient's care.     If you have any questions or wish to discuss your patient further, please do not hesitate to contact us at [phone].       Kindest personal regards,          Juan J Anaya MD, FACS  [facility] & Neurosciences  [facility], Dept. Of Surgery  [address] [place] [address], [address]  [place] [address] [phone]

## 2018-10-24 NOTE — PROGRESS NOTES
"NOLANETS:  [facility]  A collaboration between [facility] and [facility]    PATIENT: Celena Kumar  MRN: [id number]  DATE: 10/24/2018    Vitals:    10/24/18 1047   BP: 117/80   Pulse: 103   Resp: 18   Temp: 97.4 °F (36.3 °C)   TempSrc: Oral   Weight: 66.3 kg (146 lb 4.4 oz)   Height: 5' 2" (1.575 m)      Last 2 Weight Measurements:   Wt Readings from Last 2 Encounters:   10/24/18 66.3 kg (146 lb 4.4 oz)   04/17/18 66.6 kg (146 lb 13.2 oz)     BMI: Body mass index is 26.75 kg/m².    Karnofsky Score    Karnofsky Score:  50% - Requires Considerable Assistance and Frequent Medical Care       Diagnosis:   1. Malignant carcinoid tumor of appendix    2. Malignant carcinoid tumor of rectum    3. Neoplasm of abdomen      Interval History: Ms. 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    Knee pain     left    PMS2-related Martin syndrome (HNPCC4)     S/P cholecystectomy 09/22/2016    Torn ligament     right thumb ulna collateral ligament       Past Surgical History:   Procedure Laterality Date    Appendectomy & gallbladder  09/22/2016    NET    colonscopy  03/2017    Carcinoid tumor    HYSTERECTOMY      KNEE SURGERY      lipoma removal      L and R thigh and lower back    SHOULDER SURGERY         Review of patient's allergies indicates:   Allergen Reactions    Adhesive Rash    Contrast media Hives    Demerol [meperidine] Shortness Of Breath    Dilaudid [hydromorphone (bulk)] Shortness Of Breath    Strattera [atomoxetine] Shortness Of Breath    Advair diskus [fluticasone-salmeterol] Rash    Singulair [montelukast] Hives    Sulfa (sulfonamide antibiotics) Rash    " "Vancomycin analogues Hives       Current Outpatient Medications   Medication Sig Dispense Refill    albuterol (VENTOLIN HFA) 90 mcg/actuation inhaler as needed.       cetirizine (ZYRTEC) 10 MG tablet Take 10 mg by mouth once daily.      dextroamphetamine-amphetamine (ADDERALL XR) 30 MG 24 hr capsule as needed.       diazePAM (VALIUM) 2 MG tablet as needed.       diphenhydrAMINE (BENADRYL) 50 MG tablet Take 1 tablet (50 mg total) by mouth As instructed for Itching. Take 50 mg 1 hour before scan 1 tablet 2    famotidine (PEPCID AC) 10 MG tablet Take 10 mg by mouth 2 (two) times daily.      fluticasone (FLONASE) 50 mcg/actuation nasal spray 2 sprays by Each Nare route once daily.      inhalation spacing device (INSPIRACHAMBER)       lanreotide (SOMATULINE DEPOT) 120 mg/0.5 mL Syrg Inject 120 mg into the skin every 28 days.      lidocaine (LIDODERM) 5 % 3 patches as needed.       octreotide (SANDOSTATIN) 1,000 mcg/mL Soln Inject 0.2 mLs (200 mcg total) into the skin as needed (up to three times a day). Dispense 5 vials total and refill 4 times 25 mL 4    ondansetron (ZOFRAN-ODT) 4 MG TbDL DIS ONE T PO  Q 6 H PRN N      pen needle, diabetic 31 gauge x 5/16" Ndle by Misc.(Non-Drug; Combo Route) route.      sumatriptan (IMITREX) 50 MG tablet TK 1 T PO FOR MIGRAINE HEADACHE. MAY REPEAT IN 2 HOURS BUT NOT TO EXCEED 4 TS IN 24 HOURS/ as needed      zolpidem (AMBIEN) 10 mg Tab nightly as needed.       fluticasone-salmeterol 250-50 mcg/dose (ADVAIR) 250-50 mcg/dose diskus inhaler Inhale 1 puff into the lungs 2 (two) times daily. Controller      ranitidine (ZANTAC) 300 MG capsule Take 300 mg by mouth as needed.        No current facility-administered medications for this visit.        Review of Systems     Number of Days per Week Number per Day   DIARRHEA 5 days per month -- just before shot    BRISTOL STOOL SCALE RATING     FLUSHING 3-4 1-4 episodes per day--  Last 2-3 minutes to one hour     WHEEZING 2 " Occasional--sob      WEIGHT GAIN/LOSS 146 today-- stable   ENERGY RATING (0-10) 10        Physical Exam: deferred.     Pathology Data:  No new tissue    Laboratory Data:  Neuroendocrine Labs Latest Ref Rng & Units 7/3/2018   EXT 5 HIAA BLOOD 0 - 22 ng/ml    EXT GASTRIN 0 - 100 pg/ml 18   EXT SEROTONIN 56 - 244 ng/ml 62   EXT CHROMOGRANIN A 25 - 140 ng/ml 61   EXT PANCREASTATIN JACQUELYN 10 - 135 pg/ml    EXT NEUROKININ A JACQUELYN 0 - 40 pg/ml    EXT ANTI ISLET CELL AB     EXT FOLATE >5.4 ng/ml    EXT VITAMIN B12 200 - 1,100 pg/ml 402   EXT PANCREATIC POLYPEPTIDE 61 - 1,192 pg/ml 101   EXT VIP 0 - 75 pg/ml <50   EXT SUBSTANCE P 0 - 1,700 pg/ml    EXT HISTAMINE 0.1 - 1.8 ng/ml <1.5   EXT TRYPTASE 0 - 11 ng/ml    EXT PTH, INTACT 14 - 64 pg/ml 49   EXT FSH miu/ml 22.8   EXT WBC 3.8 - 10.8 1000/ul 5.0   EXT HGB 11.7 - 15.5 g/dl 13.6   EXT HCT 35.0 - 45.0 % 40.3   EXT PLATLETS 140 - 400 1000/ul 247   EXT GLUCOSE 65 - 99 mg/dl 114 (A)   EXT BUN 7 - 25 mg/dl 17   EXT CREATININE 0.50 - 1.10 mg/dl 0.76   EXT  - 146 mmol/l 139   EXT K 3.5 - 5.3 mmol/l 4.1   EXT CHLORIDE 98 - 110 mmol/l 103   EXT CO2 20 - 31 mmol/l 27   EXT CALCIUM 8.6 - 10.2 mg/dl 9.3   EXT PROTEIN, TOTAL 6.1 - 8.1 g/dl 6.7   EXT ALBUMIN 3.6 - 5.1 g/dl 4.2   EXT TOTAL BILIRUBIN 0.2 - 1.2 mg/dl 0.4   EXT ALK PHOSPHATASE 33 - 115 u/l 60   EXT SGOT (AST) 10 - 30 u/l 13   EXT ALT 6 - 29 u/l 10   NSE 0 - 10.8 ng/ml 5.5   EXT NSE 0 - 10.8    Weight           Radiology Data:                        Impression:  1.  stable       Plan: go ahead with upper eus and bx          restage in 6 month for rectal and appendiceal NET       Labs: Markers: 3 month intervals             Scans: 6 month intervals    Scopes: 6 month intervals--alternate flex sig and lower eus    Echocardiogram: 12 month intervals    Return to Clinic:6 month intervals    Orders placed this visit:   Orders Placed This Encounter   Procedures    CT Abdomen Pelvis With Contrast    MRI Abdomen W WO Contrast     5-HIAA Plasma (Neuoendocrine)    Serotonin serum    Pancreastatin    Neurokinin A    Gastrin    Comprehensive metabolic panel    CBC auto differential    2D echo with color flow doppler        25 Minutes Face-to-Face; Counseling/Coordination of Care > 50 percent of Visit     Juan J Anaya MD, FACS  [facility] of [facility], [facility]  [address], [address]  [place]  [address]  Cell [phone]  ph. [phone]; [phone]  fax. [phone]  [email or web address]

## 2018-10-25 ENCOUNTER — PATIENT MESSAGE (OUTPATIENT)
Dept: NEUROLOGY | Facility: HOSPITAL | Age: 43
End: 2018-10-25

## 2019-05-28 DIAGNOSIS — C7A.020 MALIGNANT CARCINOID TUMOR OF APPENDIX: ICD-10-CM

## 2019-05-28 RX ORDER — OCTREOTIDE ACETATE 1000 UG/ML
INJECTION INTRAVENOUS
Qty: 25 ML | Refills: 3 | Status: SHIPPED | OUTPATIENT
Start: 2019-05-28

## 2019-07-17 ENCOUNTER — TELEPHONE (OUTPATIENT)
Dept: NEUROLOGY | Facility: HOSPITAL | Age: 44
End: 2019-07-17

## 2019-07-23 ENCOUNTER — TELEPHONE (OUTPATIENT)
Dept: NEUROLOGY | Facility: HOSPITAL | Age: 44
End: 2019-07-23

## 2019-07-23 NOTE — TELEPHONE ENCOUNTER
Spoke with patient in regards to upcoming appointment with Dr. Anaya. Patient wants to cancel at this time, since Dr. Anaya is retiring. Patient will call us back with her decision if she decides to come back.

## 2019-12-18 ENCOUNTER — PATIENT MESSAGE (OUTPATIENT)
Dept: NEUROLOGY | Facility: HOSPITAL | Age: 44
End: 2019-12-18

## 2024-04-18 NOTE — TELEPHONE ENCOUNTER
----- Message from Kassandra Patiño sent at 3/12/2018  9:55 AM CDT -----  Contact: Patient  EAW:  Patient has sent pathology report from her Colonoscopy last month.  She has a few questions:    1. Is there any further pathology /testing that Dr. Anaya would like to run?  2. I believe this is related to precancerous finding associated with Martin syndrome related colorectal cancer as opposed to neuroendocrine - is there a way for this to be confirmed? (I have PMS2 Martin Syndrome)  3. Does this change my existing monitoring plan (tests, frequency, etc.)?    Ms. Kumar can be reached at [phone].  Thank you  abc  
Left message for patient to return call on voice mail.    
Cigarettes